# Patient Record
Sex: FEMALE | Race: WHITE | NOT HISPANIC OR LATINO | Employment: FULL TIME | URBAN - METROPOLITAN AREA
[De-identification: names, ages, dates, MRNs, and addresses within clinical notes are randomized per-mention and may not be internally consistent; named-entity substitution may affect disease eponyms.]

---

## 2017-02-18 ENCOUNTER — OFFICE VISIT (OUTPATIENT)
Dept: URGENT CARE | Facility: URGENT CARE | Age: 22
End: 2017-02-18
Payer: COMMERCIAL

## 2017-02-18 VITALS
WEIGHT: 161 LBS | HEART RATE: 80 BPM | SYSTOLIC BLOOD PRESSURE: 139 MMHG | BODY MASS INDEX: 26.79 KG/M2 | OXYGEN SATURATION: 96 % | TEMPERATURE: 98.1 F | DIASTOLIC BLOOD PRESSURE: 79 MMHG

## 2017-02-18 DIAGNOSIS — J20.9 ACUTE BRONCHITIS WITH SYMPTOMS > 10 DAYS: Primary | ICD-10-CM

## 2017-02-18 PROCEDURE — 99213 OFFICE O/P EST LOW 20 MIN: CPT | Performed by: FAMILY MEDICINE

## 2017-02-18 RX ORDER — AZITHROMYCIN 250 MG/1
TABLET, FILM COATED ORAL
Qty: 6 TABLET | Refills: 0 | Status: SHIPPED | OUTPATIENT
Start: 2017-02-18 | End: 2017-06-01

## 2017-02-18 NOTE — NURSING NOTE
"Chief Complaint   Patient presents with     Sick       Initial /79  Pulse 80  Temp 98.1  F (36.7  C) (Oral)  Wt 161 lb (73 kg)  SpO2 96%  BMI 26.79 kg/m2 Estimated body mass index is 26.79 kg/(m^2) as calculated from the following:    Height as of 9/22/16: 5' 5\" (1.651 m).    Weight as of this encounter: 161 lb (73 kg).  Medication Reconciliation: complete     DIGNA Cortez      "

## 2017-02-18 NOTE — MR AVS SNAPSHOT
After Visit Summary   2/18/2017    Nina Washington    MRN: [id number]           Patient Information     Date Of Birth          1995        Visit Information        Provider Department      2/18/2017 2:25 PM Jennifer Valdes MD [facility]        Today's Diagnoses     Acute bronchitis with symptoms > 10 days    -  1       Follow-ups after your visit        Who to contact     If you have questions or need follow up information about today's clinic visit or your schedule please contact [facility] directly at [phone].  Normal or non-critical lab and imaging results will be communicated to you by Click [employer] Growhart, letter or phone within 4 business days after the clinic has received the results. If you do not hear from us within 7 days, please contact the clinic through Xerot or phone. If you have a critical or abnormal lab result, we will notify you by phone as soon as possible.  Submit refill requests through [employer] or call your pharmacy and they will forward the refill request to us. Please allow 3 business days for your refill to be completed.          Additional Information About Your Visit        MyChart Information     [employer] gives you secure access to your electronic health record. If you see a primary care provider, you can also send messages to your care team and make appointments. If you have questions, please call your primary care clinic.  If you do not have a primary care provider, please call [phone] and they will assist you.        Care EveryWhere ID     This is your Care EveryWhere ID. This could be used by other organizations to access your [facility] medical records  [id number]        Your Vitals Were     Pulse Temperature Pulse Oximetry BMI (Body Mass Index)          80 98.1  F (36.7  C) (Oral) 96% 26.79 kg/m2         Blood Pressure from Last 3 Encounters:   02/18/17 139/79   09/22/16 140/79   07/22/16 134/86    Weight from Last 3 Encounters:    02/18/17 161 lb (73 kg)   09/22/16 151 lb 6.4 oz (68.7 kg)   07/22/16 145 lb (65.8 kg)              Today, you had the following     No orders found for display         Today's Medication Changes          These changes are accurate as of: 2/18/17 11:52 PM.  If you have any questions, ask your nurse or doctor.               Start taking these medicines.        Dose/Directions    azithromycin 250 MG tablet   Commonly known as:  ZITHROMAX   Used for:  Acute bronchitis with symptoms > 10 days   Started by:  Jennifer Valdes MD        2 tablets the first day, then 1 tablet daily for the next 4 days   Quantity:  6 tablet   Refills:  0            Where to get your medicines      These medications were sent to [id number] - [address] [facility] [address] AT [address] [place], [place] [address]     Phone:  [phone]     azithromycin 250 MG tablet                Primary Care Provider Office Phone # Fax #    Jaiden Marrero PA-C [phone]       [facility] [address] [facility] [address]        Thank you!     Thank you for choosing [facility]  for your care. Our goal is always to provide you with excellent care. Hearing back from our patients is one way we can continue to improve our services. Please take a few minutes to complete the written survey that you may receive in the mail after your visit with us. Thank you!             Your Updated Medication List - Protect others around you: Learn how to safely use, store and throw away your medicines at www.disposemymeds.org.          This list is accurate as of: 2/18/17 11:52 PM.  Always use your most recent med list.                   Brand Name Dispense Instructions for use    azithromycin 250 MG tablet    ZITHROMAX    6 tablet    2 tablets the first day, then 1 tablet daily for the next 4 days       drospirenone-ethinyl estradiol 3-0.03 MG per  tablet    OCELLA    84 tablet    Take 1 tablet by mouth daily

## 2017-05-27 ENCOUNTER — TRANSFERRED RECORDS (OUTPATIENT)
Dept: HEALTH INFORMATION MANAGEMENT | Facility: CLINIC | Age: 22
End: 2017-05-27

## 2017-06-01 ENCOUNTER — OFFICE VISIT (OUTPATIENT)
Dept: FAMILY MEDICINE | Facility: CLINIC | Age: 22
End: 2017-06-01
Payer: COMMERCIAL

## 2017-06-01 VITALS
HEART RATE: 110 BPM | HEIGHT: 65 IN | DIASTOLIC BLOOD PRESSURE: 88 MMHG | BODY MASS INDEX: 26.66 KG/M2 | RESPIRATION RATE: 16 BRPM | SYSTOLIC BLOOD PRESSURE: 130 MMHG | OXYGEN SATURATION: 98 % | TEMPERATURE: 99.5 F | WEIGHT: 160 LBS

## 2017-06-01 DIAGNOSIS — R59.0 ENLARGED LYMPH NODE IN NECK: Primary | ICD-10-CM

## 2017-06-01 LAB
BASOPHILS # BLD AUTO: 0 10E9/L (ref 0–0.2)
BASOPHILS NFR BLD AUTO: 0.3 %
DIFFERENTIAL METHOD BLD: ABNORMAL
EOSINOPHIL # BLD AUTO: 0.1 10E9/L (ref 0–0.7)
EOSINOPHIL NFR BLD AUTO: 1 %
ERYTHROCYTE [DISTWIDTH] IN BLOOD BY AUTOMATED COUNT: 12.2 % (ref 10–15)
HCT VFR BLD AUTO: 40.5 % (ref 35–47)
HETEROPH AB SER QL: NEGATIVE
HGB BLD-MCNC: 13.7 G/DL (ref 11.7–15.7)
LYMPHOCYTES # BLD AUTO: 1.8 10E9/L (ref 0.8–5.3)
LYMPHOCYTES NFR BLD AUTO: 15.8 %
MCH RBC QN AUTO: 29 PG (ref 26.5–33)
MCHC RBC AUTO-ENTMCNC: 33.8 G/DL (ref 31.5–36.5)
MCV RBC AUTO: 86 FL (ref 78–100)
MONOCYTES # BLD AUTO: 0.7 10E9/L (ref 0–1.3)
MONOCYTES NFR BLD AUTO: 5.9 %
NEUTROPHILS # BLD AUTO: 8.6 10E9/L (ref 1.6–8.3)
NEUTROPHILS NFR BLD AUTO: 77 %
PLATELET # BLD AUTO: 326 10E9/L (ref 150–450)
RBC # BLD AUTO: 4.73 10E12/L (ref 3.8–5.2)
WBC # BLD AUTO: 11.1 10E9/L (ref 4–11)

## 2017-06-01 PROCEDURE — 85025 COMPLETE CBC W/AUTO DIFF WBC: CPT | Performed by: PHYSICIAN ASSISTANT

## 2017-06-01 PROCEDURE — 36415 COLL VENOUS BLD VENIPUNCTURE: CPT | Performed by: PHYSICIAN ASSISTANT

## 2017-06-01 PROCEDURE — 99213 OFFICE O/P EST LOW 20 MIN: CPT | Performed by: PHYSICIAN ASSISTANT

## 2017-06-01 PROCEDURE — 86308 HETEROPHILE ANTIBODY SCREEN: CPT | Performed by: PHYSICIAN ASSISTANT

## 2017-06-01 RX ORDER — CLINDAMYCIN HCL 300 MG
CAPSULE ORAL
COMMUNITY
Start: 2017-05-27 | End: 2017-11-24

## 2017-06-01 ASSESSMENT — PAIN SCALES - GENERAL: PAINLEVEL: MILD PAIN (2)

## 2017-06-01 NOTE — NURSING NOTE
"Chief Complaint   Patient presents with     Lymphadenopathy     pt was seen at the [facility] clinic for Lymphadentis, given antibiotic but not better per pt       Initial /88  Pulse 110  Temp 99.5  F (37.5  C) (Oral)  Resp 16  Ht 5' 4.96\" (1.65 m)  Wt 160 lb (72.6 kg)  SpO2 98%  Breastfeeding? No  BMI 26.66 kg/m2 Estimated body mass index is 26.66 kg/(m^2) as calculated from the following:    Height as of this encounter: 5' 4.96\" (1.65 m).    Weight as of this encounter: 160 lb (72.6 kg).  Medication Reconciliation: complete   Tni Randhawa MA      "

## 2017-06-01 NOTE — PROGRESS NOTES
SUBJECTIVE:                                                    Nina Washington is a 22 year old female who presents to clinic today for the following health issues:    Swollen gland      Duration: 3 weeks    Description (location/character/radiation): swelling     Intensity:  mild    Accompanying signs and symptoms: none    History (similar episodes/previous evaluation): None    Precipitating or alleviating factors: None    Therapies tried and outcome: Antibiotic   No recent illness. Slight fatigue. No fevers, sore throat. States she otherwise feels fine.     Problem list and histories reviewed & adjusted, as indicated.  Additional history: as documented    Patient Active Problem List   Diagnosis     Lightheadedness     GERD (gastroesophageal reflux disease)     Orthostatic hypotension     Dysmenorrhea     Plantar warts     Skin abnormalities     Past Surgical History:   Procedure Laterality Date     NO HISTORY OF SURGERY       wisdom teeth  06/16       Social History   Substance Use Topics     Smoking status: Never Smoker     Smokeless tobacco: Never Used     Alcohol use No     Family History   Problem Relation Age of Onset     Hypertension Father      Allergies Father      Allergies Mother      Hypertension Paternal Grandfather      Prostate Cancer Paternal Grandfather      Cancer - colorectal Maternal Grandmother      Allergies Brother      CANCER Maternal Grandfather          Current Outpatient Prescriptions   Medication Sig Dispense Refill     clindamycin (CLEOCIN) 300 MG capsule        drospirenone-ethinyl estradiol (OCELLA) 3-0.03 MG per tablet Take 1 tablet by mouth daily 84 tablet 4     Allergies   Allergen Reactions     Nkda [No Known Drug Allergies]      Labs reviewed in EPIC    ROS:  Constitutional, HEENT, cardiovascular, pulmonary, gi and gu systems are negative, except as otherwise noted.    OBJECTIVE:                                                    /88  Pulse 110  Temp 99.5  F (37.5  C)  "(Oral)  Resp 16   5' 4.96\" (1.65 m)  Wt 160 lb (72.6 kg)  SpO2 98%  Breastfeeding? No  BMI 26.66 kg/m2  Body mass index is 26.66 kg/(m^2).  GENERAL: healthy, alert and no distress  EYES: Eyes grossly normal to inspection, PERRL and conjunctivae and sclerae normal  HENT: ear canals and TM's normal, nose and mouth without ulcers or lesions  NECK: cervical adenopathy 3 cm right, firm fixed. 1cm on left. no asymmetry, masses, or scars and thyroid normal to palpation  RESP: lungs clear to auscultation - no rales, rhonchi or wheezes  CV: regular rate and rhythm, normal S1 S2, no S3 or S4, no murmur, click or rub, no peripheral edema and peripheral pulses strong  ABDOMEN: soft, nontender, no hepatosplenomegaly, no masses and bowel sounds normal    Diagnostic Test Results:  Results for orders placed or performed in visit on 06/01/17 (from the past 24 hour(s))   CBC with platelets differential   Result Value Ref Range    WBC 11.1 (H) 4.0 - 11.0 10e9/L    RBC Count 4.73 3.8 - 5.2 10e12/L    Hemoglobin 13.7 11.7 - 15.7 g/dL    Hematocrit 40.5 35.0 - 47.0 %    MCV 86 78 - 100 fl    MCH 29.0 26.5 - 33.0 pg    MCHC 33.8 31.5 - 36.5 g/dL    RDW 12.2 10.0 - 15.0 %    Platelet Count 326 150 - 450 10e9/L    Diff Method Automated Method     % Neutrophils 77.0 %    % Lymphocytes 15.8 %    % Monocytes 5.9 %    % Eosinophils 1.0 %    % Basophils 0.3 %    Absolute Neutrophil 8.6 (H) 1.6 - 8.3 10e9/L    Absolute Lymphocytes 1.8 0.8 - 5.3 10e9/L    Absolute Monocytes 0.7 0.0 - 1.3 10e9/L    Absolute Eosinophils 0.1 0.0 - 0.7 10e9/L    Absolute Basophils 0.0 0.0 - 0.2 10e9/L   Mononucleosis screen   Result Value Ref Range    Mononucleosis Screen Negative NEG        ASSESSMENT/PLAN:                                                        ICD-10-CM    1. Enlarged lymph node in neck R59.0 CBC with platelets differential     Mononucleosis screen     US Head Neck Soft Tissue   advised getting U.S of neck.   warning signs discussed.   She " wanted to check with her insurance 1st before scheduling.    Jaiden Marrero PA-C  [facility]

## 2017-06-01 NOTE — MR AVS SNAPSHOT
After Visit Summary   6/1/2017    Nina Washington    MRN: [id number]           Patient Information     Date Of Birth          1995        Visit Information        Provider Department      6/1/2017 10:00 AM Jaiden Marrero PA-C [facility]        Today's Diagnoses     Enlarged lymph node in neck    -  1       Follow-ups after your visit        Future tests that were ordered for you today     Open Future Orders        Priority Expected Expires Ordered    US Head Neck Soft Tissue Routine  6/1/2018 6/1/2017            Who to contact     If you have questions or need follow up information about today's clinic visit or your schedule please contact [facility] directly at [phone].  Normal or non-critical lab and imaging results will be communicated to you by MyChart, letter or phone within 4 business days after the clinic has received the results. If you do not hear from us within 7 days, please contact the clinic through Schedule C Systemshart or phone. If you have a critical or abnormal lab result, we will notify you by phone as soon as possible.  Submit refill requests through Factor 14 or call your pharmacy and they will forward the refill request to us. Please allow 3 business days for your refill to be completed.          Additional Information About Your Visit        MyChart Information     [employer] gives you secure access to your electronic health record. If you see a primary care provider, you can also send messages to your care team and make appointments. If you have questions, please call your primary care clinic.  If you do not have a primary care provider, please call [phone] and they will assist you.        Care EveryWhere ID     This is your Care EveryWhere ID. This could be used by other organizations to access your [facility] medical records  [id number]        Your Vitals Were     Pulse Temperature Respirations Height Pulse Oximetry Breastfeeding?    110 99.5  F  "(37.5  C) (Oral) 16 5' 4.96\" (1.65 m) 98% No    BMI (Body Mass Index)                   26.66 kg/m2            Blood Pressure from Last 3 Encounters:   06/01/17 130/88   02/18/17 139/79   09/22/16 140/79    Weight from Last 3 Encounters:   06/01/17 160 lb (72.6 kg)   02/18/17 161 lb (73 kg)   09/22/16 151 lb 6.4 oz (68.7 kg)              We Performed the Following     CBC with platelets differential     Mononucleosis screen        Primary Care Provider Office Phone # Fax #    Jaiden Marrero PA-C [phone]       [facility] [address] [facility] [address]        Thank you!     Thank you for choosing [facility]  for your care. Our goal is always to provide you with excellent care. Hearing back from our patients is one way we can continue to improve our services. Please take a few minutes to complete the written survey that you may receive in the mail after your visit with us. Thank you!             Your Updated Medication List - Protect others around you: Learn how to safely use, store and throw away your medicines at www.disposemymeds.org.          This list is accurate as of: 6/1/17 10:28 AM.  Always use your most recent med list.                   Brand Name Dispense Instructions for use    clindamycin 300 MG capsule    CLEOCIN         drospirenone-ethinyl estradiol 3-0.03 MG per tablet    OCELLA    84 tablet    Take 1 tablet by mouth daily         "

## 2017-06-05 ENCOUNTER — TRANSFERRED RECORDS (OUTPATIENT)
Dept: HEALTH INFORMATION MANAGEMENT | Facility: CLINIC | Age: 22
End: 2017-06-05

## 2017-06-06 ENCOUNTER — TELEPHONE (OUTPATIENT)
Dept: FAMILY MEDICINE | Facility: CLINIC | Age: 22
End: 2017-06-06

## 2017-06-06 DIAGNOSIS — R59.0 ENLARGED LYMPH NODE IN NECK: Primary | ICD-10-CM

## 2017-06-06 NOTE — TELEPHONE ENCOUNTER
Reason for Call:  Request for results:    Name of test or procedure: Ultra Sound of neck     Date of test of procedure: 6/6/17     Location of the test or procedure: [facility] in [place]     OK to leave the result message on voice mail or with a family member? YES    Phone number Patient can be reached at:  Cell number on file:    Telephone Information:   Mobile [phone]       Additional comments: Patient is wondering if the Dr is going to call her to go over the results of her ultra sound or what are the next steps?   Call taken on 6/6/2017 at 4:46 PM by Yeni Vann

## 2017-06-06 NOTE — TELEPHONE ENCOUNTER
, can we please get a copy of the ultrasound?   Then route to PCP to review and advise.  Thank you, SURESH LopezN RN

## 2017-06-07 PROBLEM — R59.0 ENLARGED LYMPH NODE IN NECK: Status: ACTIVE | Noted: 2017-06-07

## 2017-06-07 NOTE — TELEPHONE ENCOUNTER
Received results from [facility], placed in your basket to review and comment for RN to relay results to patient.    When done, ROUTE back to MARIA ESTHER CUMMINS.    Fatimah Benton,

## 2017-06-07 NOTE — TELEPHONE ENCOUNTER
I reviewed results.     Left message on phone with results.  Will have her watch nodes and if not improving to follow up  With ENT in 4 wks.     Jaiden Marrero PA-C

## 2017-06-22 ENCOUNTER — TELEPHONE (OUTPATIENT)
Dept: FAMILY MEDICINE | Facility: CLINIC | Age: 22
End: 2017-06-22

## 2017-06-22 NOTE — TELEPHONE ENCOUNTER
"\"client you are trying to reach is not available.  Please try again later\"  X2.  Shirley Andrade RN    "

## 2017-06-22 NOTE — TELEPHONE ENCOUNTER
Reason for Call: Request for an order or referral:    Order or referral being requested: labs for lymes    Date needed: as soon as possible    Has the patient been seen by the PCP for this problem? YES    Additional comments:     Phone number Patient can be reached at:  Home number on file [phone] (home)    Best Time:      Can we leave a detailed message on this number?  YES    Call taken on 6/22/2017 at 12:21 PM by Jacqueline Pacheco

## 2017-06-23 NOTE — TELEPHONE ENCOUNTER
Left message on answering machine for patient/parent to call back.   [phone].  Corie Desai RN

## 2017-07-13 ENCOUNTER — OFFICE VISIT (OUTPATIENT)
Dept: OTOLARYNGOLOGY | Facility: CLINIC | Age: 22
End: 2017-07-13
Payer: COMMERCIAL

## 2017-07-13 VITALS — RESPIRATION RATE: 16 BRPM | WEIGHT: 162.6 LBS | HEIGHT: 65 IN | BODY MASS INDEX: 27.09 KG/M2

## 2017-07-13 DIAGNOSIS — R59.1 LYMPHADENOPATHY: Primary | ICD-10-CM

## 2017-07-13 LAB
BASOPHILS # BLD AUTO: 0 10E9/L (ref 0–0.2)
BASOPHILS NFR BLD AUTO: 0.6 %
CRP SERPL-MCNC: 6.2 MG/L (ref 0–8)
DIFFERENTIAL METHOD BLD: NORMAL
EOSINOPHIL # BLD AUTO: 0.2 10E9/L (ref 0–0.7)
EOSINOPHIL NFR BLD AUTO: 3.2 %
ERYTHROCYTE [DISTWIDTH] IN BLOOD BY AUTOMATED COUNT: 12.7 % (ref 10–15)
ERYTHROCYTE [SEDIMENTATION RATE] IN BLOOD BY WESTERGREN METHOD: 11 MM/H (ref 0–20)
HCT VFR BLD AUTO: 39.8 % (ref 35–47)
HGB BLD-MCNC: 13.6 G/DL (ref 11.7–15.7)
LYMPHOCYTES # BLD AUTO: 1.6 10E9/L (ref 0.8–5.3)
LYMPHOCYTES NFR BLD AUTO: 30.8 %
MCH RBC QN AUTO: 29.2 PG (ref 26.5–33)
MCHC RBC AUTO-ENTMCNC: 34.2 G/DL (ref 31.5–36.5)
MCV RBC AUTO: 85 FL (ref 78–100)
MONOCYTES # BLD AUTO: 0.4 10E9/L (ref 0–1.3)
MONOCYTES NFR BLD AUTO: 7.3 %
NEUTROPHILS # BLD AUTO: 3 10E9/L (ref 1.6–8.3)
NEUTROPHILS NFR BLD AUTO: 58.1 %
PLATELET # BLD AUTO: 341 10E9/L (ref 150–450)
RBC # BLD AUTO: 4.66 10E12/L (ref 3.8–5.2)
WBC # BLD AUTO: 5.1 10E9/L (ref 4–11)

## 2017-07-13 PROCEDURE — 36415 COLL VENOUS BLD VENIPUNCTURE: CPT | Performed by: OTOLARYNGOLOGY

## 2017-07-13 PROCEDURE — 85652 RBC SED RATE AUTOMATED: CPT | Performed by: OTOLARYNGOLOGY

## 2017-07-13 PROCEDURE — 85025 COMPLETE CBC W/AUTO DIFF WBC: CPT | Performed by: OTOLARYNGOLOGY

## 2017-07-13 PROCEDURE — 86140 C-REACTIVE PROTEIN: CPT | Performed by: OTOLARYNGOLOGY

## 2017-07-13 PROCEDURE — 99000 SPECIMEN HANDLING OFFICE-LAB: CPT | Performed by: OTOLARYNGOLOGY

## 2017-07-13 PROCEDURE — 86611 BARTONELLA ANTIBODY: CPT | Mod: 90 | Performed by: OTOLARYNGOLOGY

## 2017-07-13 PROCEDURE — 86618 LYME DISEASE ANTIBODY: CPT | Performed by: OTOLARYNGOLOGY

## 2017-07-13 PROCEDURE — 99203 OFFICE O/P NEW LOW 30 MIN: CPT | Performed by: OTOLARYNGOLOGY

## 2017-07-13 ASSESSMENT — PAIN SCALES - GENERAL: PAINLEVEL: NO PAIN (0)

## 2017-07-13 NOTE — PROGRESS NOTES
Chief Complaint - Neck mass    History of Present Illness - Nina Washington is a 22 year old female with neck lymphadenopathy. Began 2 months ago. Was feeling sick for awhile. Had low-grade fever and fatigue. Has right tender neck lump. Feels things are improving. No joint pain. No myalgias now, but had some at the beginning. No dysphagia, but has had some intermittent odynphagia. No breathing troubles. No weight loss. The patient doesn't smoke, or has a smoking history. She had CBC, and mono labs that showed a wbc of 11.1. Mono was negative. U/S from 6/2017 showed large lymphadenopathy 5.5 cmx1.9x2.8 right level 2 and 3.5x1.0x1.4 left level 2. All nodes have an intact fatty hilum. Tried clindamycin, didn't see help at all. She owns two cat, but didn't get bit or scratched by the cats. She doesn't recall any tick bites.     Past Medical History -   Patient Active Problem List   Diagnosis     Lightheadedness     GERD (gastroesophageal reflux disease)     Orthostatic hypotension     Dysmenorrhea     Plantar warts     Skin abnormalities     Enlarged lymph node in neck       Current Medications -   Current Outpatient Prescriptions:      clindamycin (CLEOCIN) 300 MG capsule, , Disp: , Rfl:      drospirenone-ethinyl estradiol (OCELLA) 3-0.03 MG per tablet, Take 1 tablet by mouth daily, Disp: 84 tablet, Rfl: 4    Allergies -   Allergies   Allergen Reactions     Nkda [No Known Drug Allergies]        Social History -   Social History     Social History     Marital status: Single     Spouse name: N/A     Number of children: N/A     Years of education: N/A     Social History Main Topics     Smoking status: Never Smoker     Smokeless tobacco: Never Used     Alcohol use No     Drug use: No     Sexual activity: Yes     Partners: Male     Birth control/ protection: Condom, Pill     Other Topics Concern      Service No     Blood Transfusions No     Caffeine Concern No     Occupational Exposure No     Hobby Hazards No      "Sleep Concern No     Stress Concern No     Weight Concern No     Special Diet No     Back Care No     Exercise Yes     Bike Helmet No     Seat Belt Yes     Self-Exams No     Social History Narrative       Family History -   Family History   Problem Relation Age of Onset     Hypertension Father      Allergies Father      Allergies Mother      Hypertension Paternal Grandfather      Prostate Cancer Paternal Grandfather      Cancer - colorectal Maternal Grandmother      Allergies Brother      CANCER Maternal Grandfather        Review of Systems - As per HPI and PMHx, she denies lymphadenopathy elsewhere. No dysphagia. otherwise 7 system review of the head and neck negative.    Physical Exam  Resp 16  Ht 1.65 m (5' 4.96\")  Wt 73.8 kg (162 lb 9.6 oz)  BMI 27.09 kg/m2  General - The patient is in no distress.  Alert and oriented x3, answers questions and cooperates with examination appropriately.   Voice and Breathing - The patient was breathing comfortably without the use of accessory muscles. There was no wheezing, stridor, or stertor.  The patients voice was clear and strong.  Eyes - Extraocular movements intact. Sclera were not icteric or injected, conjunctiva were pink and moist.  Neurologic - Cranial nerves II-XII are grossly intact. Specifically, the facial nerve is intact, House-Brackmann grade 1 of 6.   Ears - The auricles appeared normal. The external auditory canals were nonedematous and nonerythematous. The tympanic membranes are normal in appearance, bony landmarks are intact.  No retraction, perforation, or masses.  No fluid or purulence was seen in the external canal or the middle ear.   Nose - No significant external deformity.  Nasal mucosa is pink and moist with no abnormal mucus.  The septum was midline, turbinates are of normal size and position.  No polyps, masses, or purulence.  Mouth - Examination of the oral cavity showed pink, healthy oral mucosa. No lesions or ulcerations noted.  The tongue was " mobile and protrudes midline, no lesions.  Oropharynx - The walls of the oropharynx were smooth, symmetric, and had no lesions or ulcerations.  The tonsils were without masses or ulcerations. The uvula was midline and the palate raised symmetrically. Tonsils 2+, some crypts. No masses.  Neck - Palpation of the neck reveals a 2.5-3 cm mass in level 2 right side. It is mobile, some tenderness, likely reactive lymph node. No overlying skin changes. No fluctuance. Also a 1.5 cm lymph node in left side. The other occipital, submental, submandibular, internal jugular chain, and supraclavicular chains did not demonstrate any abnormal lymph nodes or masses. No parotid masses. Palpation of the thyroid was soft and smooth, with no nodules or goiter appreciated.  The trachea was midline.  Skin - close examination of the scalp and skin on the side of the mass reveals no suspicious lesions or skin cancers. No induration on palpation.      A/P - Nina Washington is a 22 year old female with bilateral level 2 neck lymphadenopathy for about 2 months. Also has felt fatigued, had fever, low WBC, and U/S that showed necrotic lymphadenopathy. She is better. The lymph nodes have significantly decrease in size. She still has fatigue and some tenderness. I'll repeat her CBC to make sure WBC has declined. Also will check CRP and sed rate. Lastly will check catch scratch antibodies and lyme disease screen. I will call her, and we can consider antibiotics. However, with things getting better we may not have to do that.      Bonifacio Reeder MD  Otolaryngology  [facility]

## 2017-07-13 NOTE — PATIENT INSTRUCTIONS
General Scheduling Information  To schedule your CT/MRI scan, please contact [facility] at [phone]   [address] [place] [address]    To schedule your Surgery, please contact our Specialty Schedulers at [phone]    ENT Clinic Locations Clinic Hours Telephone Number     [facility]  [address] [place] [address]   Tuesday:       8:00am -- 4:00pm    Wednesday:  8:00am - 4:00pm   To schedule an appointment with   Dr. Reeder,   please contact our   Specialty Scheduling Department at:     [phone]       [facility]  [address] [place] [address]   Friday:          8:00am - 4:00pm         Urgent Care Locations Clinic Hours Telephone Numbers     [facility]  [address] [place] [address]     Monday-Friday:     11:00pm - 9:00pm    Saturday-Sunday:  9:00am - 5:00pm   [phone]     [facility] Elliott  [address] [place] [address]     Monday-Friday:      5:00pm - 9:00pm     Saturday-Sunday:  9:00am - 5:00pm   [phone]

## 2017-07-13 NOTE — NURSING NOTE
"Chief Complaint   Patient presents with     Mass     Enlarged lymphnode(s)       Initial Resp 16  Ht 1.65 m (5' 4.96\")  Wt 73.8 kg (162 lb 9.6 oz)  BMI 27.09 kg/m2 Estimated body mass index is 27.09 kg/(m^2) as calculated from the following:    Height as of this encounter: 1.65 m (5' 4.96\").    Weight as of this encounter: 73.8 kg (162 lb 9.6 oz).  Medication Reconciliation: complete     Mag Perkins MA    "

## 2017-07-13 NOTE — MR AVS SNAPSHOT
After Visit Summary   7/13/2017    Nina Washington    MRN: [id number]           Patient Information     Date Of Birth          1995        Visit Information        Provider Department      7/13/2017 9:15 AM Bonifacio Reeder MD [facility]        Today's Diagnoses     Lymphadenopathy    -  1      Care Instructions    General Scheduling Information  To schedule your CT/MRI scan, please contact Hola Daniel at [phone]   [address] [place] [address]    To schedule your Surgery, please contact our Specialty Schedulers at [phone]    ENT Clinic Locations Clinic Hours Telephone Number     [facility] [place]  [address]  [place] [address]   Tuesday:       8:00am -- 4:00pm    Wednesday:  8:00am - 4:00pm   To schedule an appointment with   Dr. Reeder,   please contact our   Specialty Scheduling Department at:     [phone]       [facility]  [address] [place] [address]   Friday:          8:00am - 4:00pm         Urgent Care Locations Clinic Hours Telephone Numbers     [facility] [place]  [address] [place] [address]     Monday-Friday:     11:00pm - 9:00pm    Saturday-Sunday:  9:00am - 5:00pm   [phone]     [facility]  [address] [place] [address]     Monday-Friday:      5:00pm - 9:00pm     Saturday-Sunday:  9:00am - 5:00pm   [phone]               Follow-ups after your visit        Who to contact     If you have questions or need follow up information about today's clinic visit or your schedule please contact [facility] directly at [phone].  Normal or non-critical lab and imaging results will be communicated to you by MyChart, letter or phone within 4 business days after the clinic has received the results. If you do not hear from us within 7 days, please contact the clinic through MyChart or phone. If you have a critical or abnormal lab result, we will notify you by phone as  "soon as possible.  Submit refill requests through [employer] or call your pharmacy and they will forward the refill request to us. Please allow 3 business days for your refill to be completed.          Additional Information About Your Visit        [employer] Information     [employer] gives you secure access to your electronic health record. If you see a primary care provider, you can also send messages to your care team and make appointments. If you have questions, please call your primary care clinic.  If you do not have a primary care provider, please call [phone] and they will assist you.        Care EveryWhere ID     This is your Care EveryWhere ID. This could be used by other organizations to access your [place] medical records  [id number]        Your Vitals Were     Respirations Height BMI (Body Mass Index)             16 1.65 m (5' 4.96\") 27.09 kg/m2          Blood Pressure from Last 3 Encounters:   06/01/17 130/88   02/18/17 139/79   09/22/16 140/79    Weight from Last 3 Encounters:   07/13/17 73.8 kg (162 lb 9.6 oz)   06/01/17 72.6 kg (160 lb)   02/18/17 73 kg (161 lb)              We Performed the Following     B Henselae antibody IgG and IgM     CBC with platelets and differential     CRP, inflammation     ESR: Erythrocyte sedimentation rate     Lyme Disease Barb with reflex to WB Serum        Primary Care Provider Office Phone # Fax #    Jaiden Marrero PA-C [phone]       [facility] [id number] [place] [address]        Equal Access to Services     RUBEN AVILA : Hadii aad ku hadasho Soomaali, waaxda luqadaha, qaybta kaalmada adeegyada, georges archuleta. So [facility] [phone].    ATENCIÓN: Si habla español, tiene a jamison disposición servicios gratuitos de asistencia lingüística. Llame al [phone].    We comply with applicable federal civil rights laws and [place] laws. We do not discriminate on the basis of race, color, national origin, " age, disability sex, sexual orientation or gender identity.            Thank you!     Thank you for choosing [facility]  for your care. Our goal is always to provide you with excellent care. Hearing back from our patients is one way we can continue to improve our services. Please take a few minutes to complete the written survey that you may receive in the mail after your visit with us. Thank you!             Your Updated Medication List - Protect others around you: Learn how to safely use, store and throw away your medicines at www.disposemymeds.org.          This list is accurate as of: 7/13/17 11:25 AM.  Always use your most recent med list.                   Brand Name Dispense Instructions for use Diagnosis    clindamycin 300 MG capsule    CLEOCIN          drospirenone-ethinyl estradiol 3-0.03 MG per tablet    OCELLA    84 tablet    Take 1 tablet by mouth daily    Dysmenorrhea

## 2017-07-14 LAB — B BURGDOR IGG+IGM SER QL: 0.06 (ref 0–0.89)

## 2017-07-16 LAB
B HENSELAE IGG TITR SER IF: ABNORMAL {TITER}
B HENSELAE IGM TITR SER IF: ABNORMAL {TITER}

## 2017-07-18 ENCOUNTER — TELEPHONE (OUTPATIENT)
Dept: OTOLARYNGOLOGY | Facility: CLINIC | Age: 22
End: 2017-07-18

## 2017-07-18 NOTE — TELEPHONE ENCOUNTER
I spoke with patient on the phone. Lab results were normal. I think this is resolving lymphadenitis. We can observe this for now. She is feeling much better. If nodes don't go away, start to grow, or new ones appear she needs to return. She understands.

## 2017-10-28 DIAGNOSIS — N94.6 DYSMENORRHEA: ICD-10-CM

## 2017-11-03 RX ORDER — DROSPIRENONE AND ETHINYL ESTRADIOL 0.03MG-3MG
1 KIT ORAL DAILY
Qty: 28 TABLET | Refills: 0 | Status: SHIPPED | OUTPATIENT
Start: 2017-11-03 | End: 2017-11-24

## 2017-11-03 NOTE — TELEPHONE ENCOUNTER
Last WWE was with BANDAR Moore CNP on 09-22-16.   No future appt scheduled.    This writer attempted to contact Nina on 11/03/17      Reason for call about a refill request received from pharmacy and left message to return call.      If patient calls back:   Schedule Office Visit appointment within 1 month with provider for WWE. May need to be seen by primary care as BANDAR Moore CNP is still out on medical leave  Then route to care team so patient can get RF if needed to get to future appt.     Yeni Gagnon RN

## 2017-11-03 NOTE — TELEPHONE ENCOUNTER
Next 5 appointments (look out 90 days)     Nov 24, 2017  3:00 PM CST   Office Visit with Danie Adhikari MD   [facility] ([facility])    [address] [place] [address] [phone]                Prescription approved per [facility] Refill Protocol.  Yeni Gagnon RN, BAN

## 2017-11-24 ENCOUNTER — OFFICE VISIT (OUTPATIENT)
Dept: OBGYN | Facility: CLINIC | Age: 22
End: 2017-11-24
Payer: COMMERCIAL

## 2017-11-24 VITALS
DIASTOLIC BLOOD PRESSURE: 84 MMHG | BODY MASS INDEX: 26.49 KG/M2 | SYSTOLIC BLOOD PRESSURE: 142 MMHG | HEART RATE: 124 BPM | HEIGHT: 65 IN | TEMPERATURE: 98.5 F | WEIGHT: 159 LBS

## 2017-11-24 DIAGNOSIS — Z11.8 SPECIAL SCREENING EXAMINATION FOR CHLAMYDIAL DISEASE: ICD-10-CM

## 2017-11-24 DIAGNOSIS — Z01.419 ENCOUNTER FOR GYNECOLOGICAL EXAMINATION WITHOUT ABNORMAL FINDING: Primary | ICD-10-CM

## 2017-11-24 DIAGNOSIS — N94.6 DYSMENORRHEA: ICD-10-CM

## 2017-11-24 PROBLEM — R59.0 ENLARGED LYMPH NODE IN NECK: Status: RESOLVED | Noted: 2017-06-07 | Resolved: 2017-11-24

## 2017-11-24 PROCEDURE — 99395 PREV VISIT EST AGE 18-39: CPT | Performed by: OBSTETRICS & GYNECOLOGY

## 2017-11-24 PROCEDURE — 87491 CHLMYD TRACH DNA AMP PROBE: CPT | Performed by: OBSTETRICS & GYNECOLOGY

## 2017-11-24 RX ORDER — DROSPIRENONE AND ETHINYL ESTRADIOL 0.03MG-3MG
1 KIT ORAL DAILY
Qty: 84 TABLET | Refills: 4 | Status: SHIPPED | OUTPATIENT
Start: 2017-11-24 | End: 2018-12-18

## 2017-11-24 NOTE — PATIENT INSTRUCTIONS
If you have any questions regarding your visit, Please contact your care team.     [facility]: [phone]    [facility] CLINIC HOURS TELEPHONE NUMBER   RAI Xiong-    Leo Jaime-DUANE Morgan-Medical Assistant   Monday-[place]  8:00a.m-4:45 p.m  Wednesday-[place] 8:00a.m-4:45 p.m.  Thursday-[place]  8:00a.m-4:45 p.m.  Friday-[place]  8:00a.m-4:45 p.m. [facility]  [address] [place] [address]  [phone] ask [facility]  [phone] Fax  Imaging [phone]    [facility] Labor and Delivery  [address] [place] [address] [place] [address]  [phone]    [facility]  [address] [place] [address]  [phone] [facility]  [phone] Fax  Imaging [phone]     Urgent Care locations:    [place]        [place] Monday-Friday  5 pm - 9 pm  Saturday and Sunday   9 am - 5 pm    Monday-Friday   11 am - 9 pm  Saturday and Sunday   9 am - 5 pm   [phone]       If you need a medication refill, please contact your pharmacy. Please allow 3 business days for your refill to be completed.  As always, Thank you for trusting us with your healthcare needs!

## 2017-11-24 NOTE — MR AVS SNAPSHOT
After Visit Summary   11/24/2017    Nina Washington    MRN: [id number]           Patient Information     Date Of Birth          1995        Visit Information        Provider Department      11/24/2017 3:00 PM Danie Adhikari MD [facility]        Care Instructions                                                        If you have any questions regarding your visit, Please contact your care team.     [facility]: [phone]    [facility] CLINIC HOURS TELEPHONE NUMBER   Dnaie Adhikari M.D.      Rosalba-    Leo Jaime-DUANE Morgan-Medical Assistant   Monday-[place]  8:00a.m-4:45 p.m  Wednesday-[place] 8:00a.m-4:45 p.m.  Thursday-[place]  8:00a.m-4:45 p.m.  Friday-[place]  8:00a.m-4:45 p.m. [facility]  [address] [place] [address] [place] [address]  [phone] ask for [facility]  [phone] Fax  Imaging [phone]    [facility] Labor and Delivery  [address] [place] [address] [address]  [place], [place] [address] [phone]    [facility]  [address] [place] [address]  [phone] ask [facility]  [phone] Fax  Imaging [phone]     Urgent Care locations:    [facility] Monday-Friday  5 pm - 9 pm  Saturday and Sunday   9 am - 5 pm    Monday-Friday   11 am - 9 pm  Saturday and Sunday   9 am - 5 pm   [phone]       If you need a medication refill, please contact your pharmacy. Please allow 3 business days for your refill to be completed.  As always, Thank you for trusting us with your healthcare needs!            Follow-ups after your visit        Who to contact     If you have questions or need follow up information about today's clinic visit or your schedule please contact [facility] directly at [phone].  Normal or non-critical lab and imaging results will be communicated to you by  "MyChart, letter or phone within 4 business days after the clinic has received the results. If you do not hear from us within 7 days, please contact the clinic through [employer]hart or phone. If you have a critical or abnormal lab result, we will notify you by phone as soon as possible.  Submit refill requests through [employer] or call your pharmacy and they will forward the refill request to us. Please allow 3 business days for your refill to be completed.          Additional Information About Your Visit        [employer] Information     [employer] gives you secure access to your electronic health record. If you see a primary care provider, you can also send messages to your care team and make appointments. If you have questions, please call your primary care clinic.  If you do not have a primary care provider, please call [phone] and they will assist you.        Care EveryWhere ID     This is your Care EveryWhere ID. This could be used by other organizations to access your [place] medical records  [id number]        Your Vitals Were     Pulse Temperature Height Last Period Breastfeeding? BMI (Body Mass Index)    124 98.5  F (36.9  C) (Oral) 5' 4.6\" (1.641 m) 11/03/2017 (Approximate) No 26.79 kg/m2       Blood Pressure from Last 3 Encounters:   11/24/17 142/84   06/01/17 130/88   02/18/17 139/79    Weight from Last 3 Encounters:   11/24/17 159 lb (72.1 kg)   07/13/17 162 lb 9.6 oz (73.8 kg)   06/01/17 160 lb (72.6 kg)              Today, you had the following     No orders found for display       Primary Care Provider Office Phone # Fax #    Jaiden Marrero PA-C [phone] [address] [facility] [address]        Equal Access to Services     RUBEN AVILA : Cande Bowers, chris gupta, rc mike, georges archuleta. So [facility] [phone].    ATENCIÓN: Si habla español, tiene a jamison disposición servicios gratuitos de asistencia lingüística. " Rosalba faulkner [phone].    We comply with applicable federal civil rights laws and [place] laws. We do not discriminate on the basis of race, color, national origin, age, disability, sex, sexual orientation, or gender identity.            Thank you!     Thank you for choosing [facility]  for your care. Our goal is always to provide you with excellent care. Hearing back from our patients is one way we can continue to improve our services. Please take a few minutes to complete the written survey that you may receive in the mail after your visit with us. Thank you!             Your Updated Medication List - Protect others around you: Learn how to safely use, store and throw away your medicines at www.disposemymeds.org.          This list is accurate as of: 11/24/17  3:03 PM.  Always use your most recent med list.                   Brand Name Dispense Instructions for use Diagnosis    clindamycin 300 MG capsule    CLEOCIN          drospirenone-ethinyl estradiol 3-0.03 MG per tablet    CAROLYN    28 tablet    Take 1 tablet by mouth daily Patient needs to be seen prior to further refills.    Dysmenorrhea

## 2017-11-24 NOTE — NURSING NOTE
"Chief Complaint   Patient presents with     Physical     AFE       Initial /84 (BP Location: Left arm, Patient Position: Chair, Cuff Size: Adult Regular)  Pulse 124  Temp 98.5  F (36.9  C) (Oral)  Ht 5' 4.6\" (1.641 m)  Wt 159 lb (72.1 kg)  LMP 11/03/2017 (Approximate)  Breastfeeding? No  BMI 26.79 kg/m2 Estimated body mass index is 26.79 kg/(m^2) as calculated from the following:    Height as of this encounter: 5' 4.6\" (1.641 m).    Weight as of this encounter: 159 lb (72.1 kg).  Medication Reconciliation: complete   Cathy Selby CMA      "

## 2017-11-25 NOTE — PROGRESS NOTES
Nina Washington is a 22 year old year old who presents for annual exam. Patient's last menstrual period was 11/03/2017 (approximate).    HPI: Doing well.  Menses q 28-30 days x 3-4 days with dysmenorrhea controlled on OC.  Significant interval changes: none.  Current significant symptoms: none.  Other problems or concerns: none.   Contraceptive method is OC.     Past medical, obstetrical, surgical, family and social history reviewed and as noted or updated in chart.     Allergies, meds and supplements are as noted or updated in chart.     ROS:     Systems reviewed include constitutional; breast;                 cardiac; respiratory; gastrointestinal; genitourinary;                                musculoskeletal; integumentary; psychological;                                hematologic/lymphatic and endocrine.                  These systems were negative for significant symptoms except                 for the following additional: none ; see HPI.                                Exam:  VS as noted.                  Neck, nodes, breasts, abd and pelvis were                             normal or negative except for, or in particular noting, the following                pertinent findings: bilateral chronic nipple inversion, no pelvic nodularity.      Assessment: Gyn exam. Problem List updated.    Plan and Recommendations: Symptoms, problems and concerns reviewed. Health habits and vaccinations reviewed. Calcium/Vitamin D and multi-vitamin supplements instructions given. Breast/skin self-exam awareness. Screening tests including limitations discussed. Follow-up visits discussed. See orders. Pap due in 2 yrs. Tdap due now and patient to consider. RTC 1 year.  Medications and prescriptions given as noted.  I reviewed side effects, risks, benefits and instructions on proper use.  Encouraged smoking cessation.     Nina was seen today for physical.    Diagnoses and all orders for this visit:    Encounter for  gynecological examination without abnormal finding    Dysmenorrhea  -     drospirenone-ethinyl estradiol (CAROLYN) 3-0.03 MG per tablet; Take 1 tablet by mouth daily    Special screening examination for chlamydial disease  -     Cancel: Chlamydia trachomatis PCR  -     Chlamydia trachomatis PCR; Future  -     Chlamydia trachomatis PCR        Danie Adhikari MD

## 2017-11-26 LAB
C TRACH DNA SPEC QL NAA+PROBE: NEGATIVE
SPECIMEN SOURCE: NORMAL

## 2017-11-29 DIAGNOSIS — N94.6 DYSMENORRHEA: ICD-10-CM

## 2017-11-29 RX ORDER — DROSPIRENONE AND ETHINYL ESTRADIOL 0.03MG-3MG
KIT ORAL
Qty: 1 TABLET | Refills: 0 | Status: SHIPPED | OUTPATIENT
Start: 2017-11-29 | End: 2018-12-17

## 2017-11-29 NOTE — TELEPHONE ENCOUNTER
Refill was sent on 11/24/17 by Dr. Adhikari at [facility].  Can disregard request.

## 2018-12-18 ENCOUNTER — OFFICE VISIT (OUTPATIENT)
Dept: OBGYN | Facility: CLINIC | Age: 23
End: 2018-12-18
Payer: COMMERCIAL

## 2018-12-18 VITALS
HEIGHT: 66 IN | WEIGHT: 161.4 LBS | TEMPERATURE: 97.7 F | SYSTOLIC BLOOD PRESSURE: 138 MMHG | OXYGEN SATURATION: 97 % | HEART RATE: 95 BPM | BODY MASS INDEX: 25.94 KG/M2 | DIASTOLIC BLOOD PRESSURE: 92 MMHG

## 2018-12-18 DIAGNOSIS — Z23 NEED FOR TDAP VACCINATION: ICD-10-CM

## 2018-12-18 DIAGNOSIS — N94.6 DYSMENORRHEA: ICD-10-CM

## 2018-12-18 DIAGNOSIS — Z01.419 ENCOUNTER FOR GYNECOLOGICAL EXAMINATION WITHOUT ABNORMAL FINDING: Primary | ICD-10-CM

## 2018-12-18 PROCEDURE — 90471 IMMUNIZATION ADMIN: CPT | Performed by: NURSE PRACTITIONER

## 2018-12-18 PROCEDURE — 90715 TDAP VACCINE 7 YRS/> IM: CPT | Performed by: NURSE PRACTITIONER

## 2018-12-18 PROCEDURE — 99395 PREV VISIT EST AGE 18-39: CPT | Mod: 25 | Performed by: NURSE PRACTITIONER

## 2018-12-18 RX ORDER — DROSPIRENONE AND ETHINYL ESTRADIOL 0.03MG-3MG
1 KIT ORAL DAILY
Qty: 84 TABLET | Refills: 3 | Status: SHIPPED | OUTPATIENT
Start: 2018-12-18 | End: 2019-12-03

## 2018-12-18 ASSESSMENT — PAIN SCALES - GENERAL: PAINLEVEL: NO PAIN (0)

## 2018-12-18 ASSESSMENT — MIFFLIN-ST. JEOR: SCORE: 1495.92

## 2018-12-18 NOTE — NURSING NOTE
"Chief Complaint   Patient presents with     Physical       Initial BP (!) 138/92   Pulse 95   Temp 97.7  F (36.5  C) (Oral)   Ht 1.664 m (5' 5.5\")   Wt 73.2 kg (161 lb 6.4 oz)   LMP 12/04/2018 (Approximate)   SpO2 97%   BMI 26.45 kg/m   Estimated body mass index is 26.45 kg/m  as calculated from the following:    Height as of this encounter: 1.664 m (5' 5.5\").    Weight as of this encounter: 73.2 kg (161 lb 6.4 oz)..  BP completed using cuff size: regular    Screening Questionnaire for Adult Immunization    Are you sick today?   No   Do you have allergies to medications, food, a vaccine component or latex?   No   Have you ever had a serious reaction after receiving a vaccination?   No   Do you have a long-term health problem with heart disease, lung disease, asthma, kidney disease, metabolic disease (e.g. diabetes), anemia, or other blood disorder?   No   Do you have cancer, leukemia, HIV/AIDS, or any other immune system problem?   No   In the past 3 months, have you taken medications that affect  your immune system, such as prednisone, other steroids, or anticancer drugs; drugs for the treatment of rheumatoid arthritis, Crohn s disease, or psoriasis; or have you had radiation treatments?   No   Have you had a seizure, or a brain or other nervous system problem?   No   During the past year, have you received a transfusion of blood or blood     products, or been given immune (gamma) globulin or antiviral drug?   No   For women: Are you pregnant or is there a chance you could become        pregnant during the next month?   No   Have you received any vaccinations in the past 4 weeks?   No     Immunization questionnaire answers were all negative.        Per orders of Essence PORTILLO CNP, injection of Tdap given by Raven Arriola. Patient instructed to remain in clinic for 15 minutes afterwards, and to report any adverse reaction to me immediately.       Screening performed by Raven Arriola on 12/18/2018 at " 8:21 AM.        Raven Arriola CMA

## 2018-12-18 NOTE — PROGRESS NOTES
SUBJECTIVE:   CC: Nina Washington is an 23 year old woman who presents for preventive health visit.     Physical   Annual:     Getting at least 3 servings of Calcium per day:  Yes    Bi-annual eye exam:  Yes    Dental care twice a year:  Yes    Sleep apnea or symptoms of sleep apnea:  None    Diet:  Regular (no restrictions)    Frequency of exercise:  2-3 days/week    Duration of exercise:  15-30 minutes    Taking medications regularly:  Yes    Medication side effects:  None    Additional concerns today:  Yes    PHQ-2 Total Score: 0      Has questions about possible change from oral contraceptive pills to Depo Provera.       Today's PHQ-2 Score:   PHQ-2 ( 1999 Pfizer) 12/18/2018   Q1: Little interest or pleasure in doing things 0   Q2: Feeling down, depressed or hopeless 0   PHQ-2 Score 0   Q1: Little interest or pleasure in doing things Not at all   Q2: Feeling down, depressed or hopeless Not at all   PHQ-2 Score 0       Abuse: Current or Past(Physical, Sexual or Emotional)- No  Do you feel safe in your environment? Yes    Social History     Tobacco Use     Smoking status: Never Smoker     Smokeless tobacco: Never Used   Substance Use Topics     Alcohol use: No     Alcohol Use 12/18/2018   If you drink alcohol do you typically have greater than 3 drinks per day OR greater than 7 drinks per week? No   No flowsheet data found.    Reviewed orders with patient.  Reviewed health maintenance and updated orders accordingly - Yes  Patient Active Problem List   Diagnosis     Dysmenorrhea     Past Surgical History:   Procedure Laterality Date     wisdom teeth  2016       Social History     Tobacco Use     Smoking status: Never Smoker     Smokeless tobacco: Never Used   Substance Use Topics     Alcohol use: No     Family History   Problem Relation Age of Onset     Hypertension Father      Allergies Father      Allergies Mother      Hypertension Paternal Grandfather      Prostate Cancer Paternal Grandfather      Cancer -  "colorectal Maternal Grandmother      Allergies Brother      Cancer Maternal Grandfather            Mammogram not appropriate for this patient based on age.    Pertinent mammograms are reviewed under the imaging tab.  History of abnormal Pap smear: NO - age 21-29 PAP every 3 years recommended  PAP / HPV 9/22/2016   PAP NIL     Reviewed and updated as needed this visit by clinical staff  Tobacco  Allergies  Meds  Med Hx  Surg Hx  Fam Hx  Soc Hx        Reviewed and updated as needed this visit by Provider        Past Medical History:   Diagnosis Date     Dysmenorrhea       Past Surgical History:   Procedure Laterality Date     wisdom teeth  2016       Review of Systems  CONSTITUTIONAL: NEGATIVE for fever, chills, change in weight  INTEGUMENTARU/SKIN: NEGATIVE for worrisome rashes, moles or lesions  EYES: NEGATIVE for vision changes or irritation  ENT: NEGATIVE for ear, mouth and throat problems  RESP: NEGATIVE for significant cough or SOB  BREAST: NEGATIVE for masses, tenderness or discharge  CV: NEGATIVE for chest pain, palpitations or peripheral edema  GI: NEGATIVE for nausea, abdominal pain, heartburn, or change in bowel habits  : NEGATIVE for unusual urinary or vaginal symptoms. Periods are regular.  MUSCULOSKELETAL: NEGATIVE for significant arthralgias or myalgia  NEURO: NEGATIVE for weakness, dizziness or paresthesias  PSYCHIATRIC: NEGATIVE for changes in mood or affect     OBJECTIVE:   BP (!) 138/92   Pulse 95   Temp 97.7  F (36.5  C) (Oral)   Ht 1.664 m (5' 5.5\")   Wt 73.2 kg (161 lb 6.4 oz)   LMP 12/04/2018 (Approximate)   SpO2 97%   BMI 26.45 kg/m    Physical Exam  GENERAL: healthy, alert and no distress  EYES: Eyes grossly normal to inspection, PERRL and conjunctivae and sclerae normal  HENT: ear canals and TM's normal, nose and mouth without ulcers or lesions  NECK: no adenopathy, no asymmetry, masses, or scars and thyroid normal to palpation  RESP: lungs clear to auscultation - no rales, " rhonchi or wheezes  BREAST: normal without masses, tenderness or nipple discharge and no palpable axillary masses or adenopathy  CV: regular rate and rhythm, normal S1 S2, no S3 or S4, no murmur, click or rub, no peripheral edema and peripheral pulses strong  ABDOMEN: soft, nontender, no hepatosplenomegaly, no masses and bowel sounds normal   (female): normal female external genitalia, normal urethral meatus, vaginal mucosa pink, moist, well rugated, and normal cervix/adnexa/uterus without masses or discharge  MS: no gross musculoskeletal defects noted, no edema  SKIN: no suspicious lesions or rashes  NEURO: Normal strength and tone, mentation intact and speech normal  PSYCH: mentation appears normal, affect normal/bright    ASSESSMENT/PLAN:   1. Encounter for gynecological examination without abnormal finding  Pap smear up to date    2. Need for Tdap vaccination  - 1st  Administration  [03001]    3. Dysmenorrhea  We discussed oral contraceptive pills vs Depo Provera for managing her cycles and also for contraception. For Depo Provera, discussed injection every 3 months, possible menstrual changes and other side effcts. Discussed clinic policy for returning on time for injection. Discussed delay with return to ovulation and need for adequate calcium, weight bearing exercise. For now, prefers to continue oral contraceptive pills, prescription sent. Call if she would like orders for Depo.  - drospirenone-ethinyl estradiol (AUSTEN) 3-0.03 MG tablet; Take 1 tablet by mouth daily  Dispense: 84 tablet; Refill: 3    COUNSELING:  Reviewed preventive health counseling, as reflected in patient instructions  Special attention given to:        Regular exercise       Healthy diet/nutrition       Contraception       Safe sex practices/STD prevention       HIV screeninx in teen years, 1x in adult years, and at intervals if high risk    BP Readings from Last 1 Encounters:   18 (!) 138/92     Estimated body mass index is  "26.45 kg/m  as calculated from the following:    Height as of this encounter: 1.664 m (5' 5.5\").    Weight as of this encounter: 73.2 kg (161 lb 6.4 oz).           reports that  has never smoked. she has never used smokeless tobacco.      Counseling Resources:  ATP IV Guidelines  Pooled Cohorts Equation Calculator  Breast Cancer Risk Calculator  FRAX Risk Assessment  ICSI Preventive Guidelines  Dietary Guidelines for Americans, 2010  USDA's MyPlate  ASA Prophylaxis  Lung CA Screening    BANDAR Bennett [facility]  "

## 2019-06-04 ENCOUNTER — TRANSFERRED RECORDS (OUTPATIENT)
Dept: HEALTH INFORMATION MANAGEMENT | Facility: CLINIC | Age: 24
End: 2019-06-04

## 2019-06-06 ENCOUNTER — OFFICE VISIT (OUTPATIENT)
Dept: FAMILY MEDICINE | Facility: CLINIC | Age: 24
End: 2019-06-06
Payer: COMMERCIAL

## 2019-06-06 VITALS
TEMPERATURE: 98.6 F | DIASTOLIC BLOOD PRESSURE: 71 MMHG | HEART RATE: 96 BPM | WEIGHT: 154.4 LBS | RESPIRATION RATE: 20 BRPM | OXYGEN SATURATION: 98 % | BODY MASS INDEX: 25.3 KG/M2 | SYSTOLIC BLOOD PRESSURE: 117 MMHG

## 2019-06-06 DIAGNOSIS — Z09 FOLLOW-UP EXAM: ICD-10-CM

## 2019-06-06 DIAGNOSIS — H66.012 ACUTE SUPPURATIVE OTITIS MEDIA OF LEFT EAR WITH SPONTANEOUS RUPTURE OF TYMPANIC MEMBRANE, RECURRENCE NOT SPECIFIED: Primary | ICD-10-CM

## 2019-06-06 PROCEDURE — 99214 OFFICE O/P EST MOD 30 MIN: CPT | Performed by: NURSE PRACTITIONER

## 2019-06-06 RX ORDER — AMOXICILLIN 875 MG
1 TABLET ORAL 2 TIMES DAILY
COMMUNITY
Start: 2019-06-04 | End: 2019-06-06

## 2019-06-06 RX ORDER — CEFDINIR 300 MG/1
300 CAPSULE ORAL 2 TIMES DAILY
Qty: 20 CAPSULE | Refills: 0 | Status: SHIPPED | OUTPATIENT
Start: 2019-06-06 | End: 2019-12-03

## 2019-06-06 RX ORDER — OFLOXACIN 3 MG/ML
5 SOLUTION AURICULAR (OTIC) DAILY
Qty: 2 ML | Refills: 0 | Status: SHIPPED | OUTPATIENT
Start: 2019-06-06 | End: 2019-12-03

## 2019-06-06 NOTE — PROGRESS NOTES
Subjective     Nina Washington is a 24 year old female who presents to clinic today for the following health issues:    HPI   Acute Illness   Acute illness concerns: left ear pain  Onset: sunday    Fever: no    Chills/Sweats: no    Headache (location?): YES    Sinus Pressure:no    Conjunctivitis:  no    Ear Pain: YES: left    Rhinorrhea: no     Congestion: no     Sore Throat: no     Cough: no    Wheeze: no    Decreased Appetite: no    Nausea: no     Vomiting: no    Diarrhea:  no    Dysuria/Freq.: no    Fatigue/Achiness: no    Sick/Strep Exposure: no     Therapies Tried and outcome: went to [facility] on 6/4/18 and given Abx for 7 days. Feeling like ear isnt getting better.  Lyden drainage yesterday      Patient Active Problem List   Diagnosis     Dysmenorrhea     Past Surgical History:   Procedure Laterality Date     wisdom teeth  2016       Social History     Tobacco Use     Smoking status: Never Smoker     Smokeless tobacco: Never Used   Substance Use Topics     Alcohol use: No     Family History   Problem Relation Age of Onset     Hypertension Father      Allergies Father      Allergies Mother      Hypertension Paternal Grandfather      Prostate Cancer Paternal Grandfather      Cancer - colorectal Maternal Grandmother      Allergies Brother      Cancer Maternal Grandfather          Current Outpatient Medications   Medication Sig Dispense Refill     cefdinir (OMNICEF) 300 MG capsule Take 1 capsule (300 mg) by mouth 2 times daily for 10 days 20 capsule 0     drospirenone-ethinyl estradiol (AUSTEN) 3-0.03 MG tablet Take 1 tablet by mouth daily 84 tablet 3     ofloxacin (FLOXIN) 0.3 % otic solution Place 5 drops Into the left ear daily for 7 days 2 mL 0     Allergies   Allergen Reactions     Nkda [No Known Drug Allergies]      Recent Labs   Lab Test 09/21/15  1454   TSH 3.86      BP Readings from Last 3 Encounters:   06/06/19 117/71   12/18/18 (!) 138/92   11/24/17 142/84    Wt Readings from Last 3 Encounters:  "  06/06/19 70 kg (154 lb 6.4 oz)   12/18/18 73.2 kg (161 lb 6.4 oz)   11/24/17 72.1 kg (159 lb)               Reviewed and updated as needed this visit by Provider  Tobacco  Allergies  Meds  Problems  Med Hx  Surg Hx  Fam Hx         Review of Systems   ROS COMP: Constitutional, HEENT, cardiovascular, pulmonary, GI, , musculoskeletal, neuro, skin, endocrine and psych systems are negative, except as otherwise noted.      Objective    /71   Pulse 96   Temp 98.6  F (37  C) (Tympanic)   Resp 20   Wt 70 kg (154 lb 6.4 oz)   LMP 05/16/2019 (Approximate)   SpO2 98%   BMI 25.30 kg/m    Body mass index is 25.3 kg/m .     Physical Exam   GENERAL: healthy, alert and no distress  HENT: ear canals and R TM normal, nose and mouth without ulcers or lesions POSITIVE for L TM ruptured with purulent drainage coming from ear.   NECK: no adenopathy, no asymmetry, masses, or scars and thyroid normal to palpation  RESP: lungs clear to auscultation - no rales, rhonchi or wheezes  CV: regular rate and rhythm, normal S1 S2, no S3 or S4, no murmur, click or rub, no peripheral edema and peripheral pulses strong  PSYCH: mentation appears normal, affect normal/bright    Diagnostic Test Results:  Labs reviewed in Epic          Assessment & Plan       ICD-10-CM    1. Acute suppurative otitis media of left ear with spontaneous rupture of tympanic membrane, recurrence not specified H66.012 cefdinir (OMNICEF) 300 MG capsule     ofloxacin (FLOXIN) 0.3 % otic solution   2. Follow-up exam Z09         BMI:   Estimated body mass index is 25.3 kg/m  as calculated from the following:    Height as of 12/18/18: 1.664 m (5' 5.5\").    Weight as of this encounter: 70 kg (154 lb 6.4 oz).       See Patient Instructions: discussed use full course of antibiotic drops and oral abx.  Follow up if needed for persistent or worsening symptoms    Return in about 4 days (around 6/10/2019), or if symptoms worsen or fail to improve.    Kay Haney, " [facility]

## 2019-06-06 NOTE — PATIENT INSTRUCTIONS
Patient Education     Ruptured Infected Eardrum (Adult)    The middle ear is the space behind the eardrum. You have an infection of the middle ear. This can lead to pressure that causes the eardrum to break (rupture). This may cause sudden pain. Pus or blood will drain out of the ear canal. Your hearing will also likely be affected.  The infection may be treated with antibiotics. The eardrum usually heals completely on its own. If it does not, further treatment is needed. For this reason, it s important to have a follow-up exam with an ear specialist.  Home care    Keep taking prescribed antibiotics until all of the medicine is gone. Do this even if you feel better after the first few days.    Take any other medicines as prescribed.    Keep a clean cotton ball in the ear canal to absorb drainage. Change the cotton often, when it becomes soiled with fluid drainage. Don t let water get into the ear. Don t put any medicine drops into the ear unless your healthcare provider tells you to do so.  Follow-up care  Follow up with your healthcare provider in 2 weeks, or as advised. This is to make sure the infection is getting better and the eardrum is healing. Also follow up with specialists as advised for a hearing test or exam.  When to seek medical advice  Call your healthcare provider if you have any of the following:    Fever of 100.4 F (38 C) or higher    Pain that gets worse or doesn t get better    Unusual drowsiness or confusion    Headache, neck pain or a stiff neck    Convulsions (seizure)    Worsening dizziness    Worsening hearing loss or ringing in the ear  Date Last Reviewed: 10/1/2017    [id number] [employer]. [address] [place] [address], [place] [address]. All rights reserved. This information is not intended as a substitute for professional medical care. Always follow your healthcare professional's instructions.

## 2019-09-15 ENCOUNTER — NURSE TRIAGE (OUTPATIENT)
Dept: NURSING | Facility: CLINIC | Age: 24
End: 2019-09-15

## 2019-09-15 ENCOUNTER — TRANSFERRED RECORDS (OUTPATIENT)
Dept: HEALTH INFORMATION MANAGEMENT | Facility: CLINIC | Age: 24
End: 2019-09-15

## 2019-09-15 NOTE — TELEPHONE ENCOUNTER
Pt was stung by a bee on her arm and the elbow to her wrist is swollen. Very itchy, hard to sleep last night even with benadryl. Advised she she seen this am for evaluation and treatment as needed.    Bonita Lester RN, [facility]      Reason for Disposition    [1] Red streak or red line AND [2] length > 2 inches (5 cm)    Additional Information    Negative: [1] Life-threatening reaction (anaphylaxis) in the past to sting AND [2] < 2 hours since sting    Negative: Attacked by swarm of bees now    Negative: Passed out (i.e., lost consciousness, collapsed and was not responding)    Negative: Wheezing, stridor, or difficulty breathing    Negative: [1] Hoarseness or cough AND [2] sudden onset following sting    Negative: [1] Tightness in the throat or chest AND [2] sudden onset following sting    Negative: [1] Swollen tongue or difficulty swallowing AND [2] sudden onset following sting    Negative: Sounds like a life-threatening emergency to the triager    Negative: Not a bee, wasp, hornet, or yellow jacket sting    Negative: [1] Widespread hives, itching or facial swelling AND [2] started within 2 hours of sting (Exception: only at site of sting)    Negative: [1] Vomiting or abdominal cramps AND [2] started within 2 hours of sting    Negative: [1] Gave epinephrine shot AND [2] no symptoms now    Negative: Sting inside the mouth    Negative: Sting on eyeball (e.g., cornea)    Negative: Patient sounds very sick or weak to the triager    Negative: More than 50 stings    Negative: [1] Fever AND [2] red area    Negative: [1] Fever AND [2] area is very tender to touch    Protocols used: BEE OR YELLOW JACKET STING-A-

## 2019-10-07 ENCOUNTER — TELEPHONE (OUTPATIENT)
Dept: FAMILY MEDICINE | Facility: CLINIC | Age: 24
End: 2019-10-07

## 2019-10-07 NOTE — TELEPHONE ENCOUNTER
Panel Management Review  Summary:    Patient is due/failing the following:   GC annual screening  Health Maintenance Due   Topic Date Due     HIV SCREENING  03/08/2010     CHLAMYDIA SCREENING  11/24/2018     PHQ-2  01/01/2019     INFLUENZA VACCINE (1) 09/01/2019     PAP  09/22/2019        Type of outreach:    please call patient and assist with scheduling.                                                                                                                    Kay Haney NP    Chart routed to Care Team .

## 2019-10-11 NOTE — TELEPHONE ENCOUNTER
Type of outreach:    Phone, left message for patient to call back.     Patient is due/failing the following:   Pap only appointment  GC annual screening       Health Maintenance Due   Topic Date Due     HIV SCREENING  03/08/2010     CHLAMYDIA SCREENING  11/24/2018     PHQ-2  01/01/2019     INFLUENZA VACCINE (1) 09/01/2019     PAP  09/22/2019

## 2019-12-03 ENCOUNTER — OFFICE VISIT (OUTPATIENT)
Dept: OBGYN | Facility: CLINIC | Age: 24
End: 2019-12-03
Payer: COMMERCIAL

## 2019-12-03 ENCOUNTER — RESULT FOLLOW UP (OUTPATIENT)
Dept: OBGYN | Facility: CLINIC | Age: 24
End: 2019-12-03

## 2019-12-03 VITALS
TEMPERATURE: 98.3 F | WEIGHT: 160.2 LBS | DIASTOLIC BLOOD PRESSURE: 83 MMHG | OXYGEN SATURATION: 98 % | HEART RATE: 80 BPM | BODY MASS INDEX: 26.69 KG/M2 | SYSTOLIC BLOOD PRESSURE: 129 MMHG | HEIGHT: 65 IN

## 2019-12-03 DIAGNOSIS — Z11.3 SCREEN FOR STD (SEXUALLY TRANSMITTED DISEASE): ICD-10-CM

## 2019-12-03 DIAGNOSIS — Z01.419 ENCOUNTER FOR GYNECOLOGICAL EXAMINATION WITHOUT ABNORMAL FINDING: Primary | ICD-10-CM

## 2019-12-03 DIAGNOSIS — R87.610 ATYPICAL SQUAMOUS CELLS OF UNDETERMINED SIGNIFICANCE (ASCUS) ON PAPANICOLAOU SMEAR OF CERVIX: ICD-10-CM

## 2019-12-03 DIAGNOSIS — N94.6 DYSMENORRHEA: ICD-10-CM

## 2019-12-03 PROCEDURE — G0124 SCREEN C/V THIN LAYER BY MD: HCPCS | Performed by: NURSE PRACTITIONER

## 2019-12-03 PROCEDURE — 99395 PREV VISIT EST AGE 18-39: CPT | Performed by: NURSE PRACTITIONER

## 2019-12-03 PROCEDURE — 87591 N.GONORRHOEAE DNA AMP PROB: CPT | Performed by: NURSE PRACTITIONER

## 2019-12-03 PROCEDURE — 87491 CHLMYD TRACH DNA AMP PROBE: CPT | Performed by: NURSE PRACTITIONER

## 2019-12-03 PROCEDURE — G0145 SCR C/V CYTO,THINLAYER,RESCR: HCPCS | Performed by: NURSE PRACTITIONER

## 2019-12-03 RX ORDER — DROSPIRENONE AND ETHINYL ESTRADIOL 0.03MG-3MG
1 KIT ORAL DAILY
Qty: 84 TABLET | Refills: 3 | Status: SHIPPED | OUTPATIENT
Start: 2019-12-03 | End: 2020-11-16

## 2019-12-03 ASSESSMENT — PAIN SCALES - GENERAL: PAINLEVEL: NO PAIN (0)

## 2019-12-03 ASSESSMENT — MIFFLIN-ST. JEOR: SCORE: 1469.6

## 2019-12-03 NOTE — PROGRESS NOTES
SUBJECTIVE:   CC: Nina Washington is an 24 year old woman who presents for preventive health visit.     Healthy Habits:     Getting at least 3 servings of Calcium per day:  Yes    Bi-annual eye exam:  Yes    Dental care twice a year:  Yes    Sleep apnea or symptoms of sleep apnea:  None    Diet:  Regular (no restrictions)    Frequency of exercise:  2-3 days/week    Duration of exercise:  Other    Taking medications regularly:  Yes    Medication side effects:  Not applicable    PHQ-2 Total Score: 0    Additional concerns today:  No        Today's PHQ-2 Score:   PHQ-2 ( 1999 Pfizer) 12/3/2019   Q1: Little interest or pleasure in doing things 0   Q2: Feeling down, depressed or hopeless 0   PHQ-2 Score 0   Q1: Little interest or pleasure in doing things Not at all   Q2: Feeling down, depressed or hopeless Not at all   PHQ-2 Score 0       Abuse: Current or Past(Physical, Sexual or Emotional)- No  Do you feel safe in your environment? Yes        Social History     Tobacco Use     Smoking status: Never Smoker     Smokeless tobacco: Never Used   Substance Use Topics     Alcohol use: No         Alcohol Use 12/3/2019   Prescreen: >3 drinks/day or >7 drinks/week? No   Prescreen: >3 drinks/day or >7 drinks/week? -   No flowsheet data found.    Reviewed orders with patient.  Reviewed health maintenance and updated orders accordingly - Yes  Patient Active Problem List   Diagnosis     Dysmenorrhea     Past Surgical History:   Procedure Laterality Date     wisdom teeth  2016       Social History     Tobacco Use     Smoking status: Never Smoker     Smokeless tobacco: Never Used   Substance Use Topics     Alcohol use: No     Family History   Problem Relation Age of Onset     Hypertension Father      Allergies Father      Allergies Mother      Hypertension Paternal Grandfather      Prostate Cancer Paternal Grandfather      Cancer - colorectal Maternal Grandmother      Allergies Brother      Cancer Maternal Grandfather       "      Mammogram not appropriate for this patient based on age.    Pertinent mammograms are reviewed under the imaging tab.  History of abnormal Pap smear: NO - age 21-29 PAP every 3 years recommended  PAP / HPV 9/22/2016   PAP NIL     Reviewed and updated as needed this visit by clinical staff  Tobacco  Allergies  Meds  Med Hx  Surg Hx  Fam Hx  Soc Hx        Reviewed and updated as needed this visit by Provider        Past Medical History:   Diagnosis Date     Dysmenorrhea       Past Surgical History:   Procedure Laterality Date     wisdom teeth  2016       Review of Systems  CONSTITUTIONAL: NEGATIVE for fever, chills, change in weight  INTEGUMENTARU/SKIN: NEGATIVE for worrisome rashes, moles or lesions  EYES: NEGATIVE for vision changes or irritation  ENT: NEGATIVE for ear, mouth and throat problems  RESP: NEGATIVE for significant cough or SOB  BREAST: NEGATIVE for masses, tenderness or discharge  CV: NEGATIVE for chest pain, palpitations or peripheral edema  GI: NEGATIVE for nausea, abdominal pain, heartburn, or change in bowel habits  : NEGATIVE for unusual urinary or vaginal symptoms. Periods are regular.  MUSCULOSKELETAL: NEGATIVE for significant arthralgias or myalgia  NEURO: NEGATIVE for weakness, dizziness or paresthesias  PSYCHIATRIC: NEGATIVE for changes in mood or affect     OBJECTIVE:   /83 (BP Location: Right arm, Patient Position: Sitting, Cuff Size: Adult Regular)   Pulse 80   Temp 98.3  F (36.8  C) (Oral)   Ht 1.638 m (5' 4.5\")   Wt 72.7 kg (160 lb 3.2 oz)   LMP 11/28/2019 (Exact Date)   SpO2 98%   BMI 27.07 kg/m    Physical Exam  GENERAL: healthy, alert and no distress  EYES: Eyes grossly normal to inspection, PERRL and conjunctivae and sclerae normal  HENT: ear canals and TM's normal, nose and mouth without ulcers or lesions  NECK: no adenopathy, no asymmetry, masses, or scars and thyroid normal to palpation  RESP: lungs clear to auscultation - no rales, rhonchi or " "wheezes  BREAST: normal without masses, tenderness or nipple discharge and no palpable axillary masses or adenopathy  CV: regular rate and rhythm, normal S1 S2, no S3 or S4, no murmur, click or rub, no peripheral edema and peripheral pulses strong  ABDOMEN: soft, nontender, no hepatosplenomegaly, no masses and bowel sounds normal   (female): normal female external genitalia, normal urethral meatus, vaginal mucosa pink, moist, well rugated, and normal cervix/adnexa/uterus without masses or discharge  MS: no gross musculoskeletal defects noted, no edema  SKIN: no suspicious lesions or rashes  NEURO: Normal strength and tone, mentation intact and speech normal  PSYCH: mentation appears normal, affect normal/bright    ASSESSMENT/PLAN:   1. Encounter for gynecological examination without abnormal finding  - Pap imaged thin layer screen only - recommended age 21 - 24 years    2. Screen for STD (sexually transmitted disease)  - Chlamydia trachomatis PCR  - Neisseria gonorrhoeae PCR    3. Dysmenorrhea  Well managed with current oral contraceptive pill, refill x 1 year.  - drospirenone-ethinyl estradiol (AUSTEN) 3-0.03 MG tablet; Take 1 tablet by mouth daily  Dispense: 84 tablet; Refill: 3    COUNSELING:  Reviewed preventive health counseling, as reflected in patient instructions  Special attention given to:        Regular exercise       Healthy diet/nutrition       Contraception       Safe sex practices/STD prevention       HIV screeninx in teen years, 1x in adult years, and at intervals if high risk    Estimated body mass index is 27.07 kg/m  as calculated from the following:    Height as of this encounter: 1.638 m (5' 4.5\").    Weight as of this encounter: 72.7 kg (160 lb 3.2 oz).         reports that she has never smoked. She has never used smokeless tobacco.      Counseling Resources:  ATP IV Guidelines  Pooled Cohorts Equation Calculator  Breast Cancer Risk Calculator  FRAX Risk Assessment  ICSI Preventive " Guidelines  Dietary Guidelines for Americans, 2010  USDA's MyPlate  ASA Prophylaxis  Lung CA Screening    BANDAR Bennett [facility]

## 2019-12-03 NOTE — LETTER
December 17, 2019      Nina Washington  [address] [facility] [address]    Dear ,      This letter is in regards to your recent cervical cancer screening (Pap smear). Your Pap smear result was reported as ASCUS or Atypical Squamous Cells of Undetermined Significance. This means that there were mildly abnormal cells found in the sample that we collected from your cervix. The vast majority of patients with this result do not have significant cervical abnormalities.     Therefore, current guidelines recommend that you return in one year to repeat your Pap smear.      If you have additional questions regarding this result, please call our registered nurse, Rosa at [phone].    Sincerely,      BANDAR Bennett CNP/rlm

## 2019-12-04 LAB
C TRACH DNA SPEC QL NAA+PROBE: NEGATIVE
N GONORRHOEA DNA SPEC QL NAA+PROBE: NEGATIVE
SPECIMEN SOURCE: NORMAL
SPECIMEN SOURCE: NORMAL

## 2019-12-09 LAB
COPATH REPORT: ABNORMAL
PAP: ABNORMAL

## 2019-12-09 NOTE — PROGRESS NOTES
12/3/19 ASCUS Pap (25 yo). Plan pap only in 1 year.   12/9/19 Result released to [employer].   12/17/19 Result letter sent per RN request ([email or web address])

## 2020-11-16 ENCOUNTER — MYC REFILL (OUTPATIENT)
Dept: OBGYN | Facility: CLINIC | Age: 25
End: 2020-11-16

## 2020-11-16 DIAGNOSIS — N94.6 DYSMENORRHEA: ICD-10-CM

## 2020-11-16 RX ORDER — DROSPIRENONE AND ETHINYL ESTRADIOL 0.03MG-3MG
1 KIT ORAL DAILY
Qty: 84 TABLET | Refills: 0 | Status: SHIPPED | OUTPATIENT
Start: 2020-11-16 | End: 2020-12-15

## 2020-11-16 NOTE — TELEPHONE ENCOUNTER
"Requested Prescriptions   Pending Prescriptions Disp Refills     drospirenone-ethinyl estradiol (AUSTEN) 3-0.03 MG tablet 84 tablet 3     Sig: Take 1 tablet by mouth daily       Contraceptives Protocol Passed - 11/16/2020  8:56 AM        Passed - Patient is not a current smoker if age is 35 or older        Passed - Recent (12 mo) or future (30 days) visit within the authorizing provider's specialty     Patient has had an office visit with the authorizing provider or a provider within the authorizing providers department within the previous 12 mos or has a future within next 30 days. See \"Patient Info\" tab in inbasket, or \"Choose Columns\" in Meds & Orders section of the refill encounter.              Passed - Medication is active on med list        Passed - No active pregnancy on record        Passed - No positive pregnancy test in past 12 months           Prescription approved per [facility] [place].    Sarah Fang RN    "

## 2020-12-12 ENCOUNTER — HEALTH MAINTENANCE LETTER (OUTPATIENT)
Age: 25
End: 2020-12-12

## 2020-12-15 ENCOUNTER — OFFICE VISIT (OUTPATIENT)
Dept: OBGYN | Facility: CLINIC | Age: 25
End: 2020-12-15
Payer: COMMERCIAL

## 2020-12-15 VITALS
HEIGHT: 65 IN | TEMPERATURE: 97.9 F | DIASTOLIC BLOOD PRESSURE: 86 MMHG | OXYGEN SATURATION: 96 % | HEART RATE: 98 BPM | BODY MASS INDEX: 29.59 KG/M2 | WEIGHT: 177.6 LBS | SYSTOLIC BLOOD PRESSURE: 133 MMHG

## 2020-12-15 DIAGNOSIS — N94.6 DYSMENORRHEA: ICD-10-CM

## 2020-12-15 DIAGNOSIS — R87.610 ATYPICAL SQUAMOUS CELLS OF UNDETERMINED SIGNIFICANCE (ASCUS) ON PAPANICOLAOU SMEAR OF CERVIX: ICD-10-CM

## 2020-12-15 DIAGNOSIS — Z11.59 NEED FOR HEPATITIS C SCREENING TEST: ICD-10-CM

## 2020-12-15 DIAGNOSIS — R89.9 ABNORMAL LABORATORY TEST RESULT: ICD-10-CM

## 2020-12-15 DIAGNOSIS — Z83.49 FAMILY HISTORY OF THYROID DISEASE: ICD-10-CM

## 2020-12-15 DIAGNOSIS — Z01.419 ENCOUNTER FOR GYNECOLOGICAL EXAMINATION WITHOUT ABNORMAL FINDING: Primary | ICD-10-CM

## 2020-12-15 LAB
T4 FREE SERPL-MCNC: 1.1 NG/DL (ref 0.76–1.46)
TSH SERPL DL<=0.005 MIU/L-ACNC: 4.56 MU/L (ref 0.4–4)

## 2020-12-15 PROCEDURE — 84439 ASSAY OF FREE THYROXINE: CPT | Performed by: NURSE PRACTITIONER

## 2020-12-15 PROCEDURE — 84443 ASSAY THYROID STIM HORMONE: CPT | Performed by: NURSE PRACTITIONER

## 2020-12-15 PROCEDURE — 86803 HEPATITIS C AB TEST: CPT | Performed by: NURSE PRACTITIONER

## 2020-12-15 PROCEDURE — 99395 PREV VISIT EST AGE 18-39: CPT | Performed by: NURSE PRACTITIONER

## 2020-12-15 PROCEDURE — 36415 COLL VENOUS BLD VENIPUNCTURE: CPT | Performed by: NURSE PRACTITIONER

## 2020-12-15 RX ORDER — DROSPIRENONE AND ETHINYL ESTRADIOL 0.03MG-3MG
1 KIT ORAL DAILY
Qty: 84 TABLET | Refills: 3 | Status: SHIPPED | OUTPATIENT
Start: 2020-12-15 | End: 2021-12-16

## 2020-12-15 ASSESSMENT — MIFFLIN-ST. JEOR: SCORE: 1555.43

## 2020-12-15 ASSESSMENT — PAIN SCALES - GENERAL: PAINLEVEL: NO PAIN (0)

## 2020-12-15 NOTE — PROGRESS NOTES
SUBJECTIVE:   CC: Nina Washington is an 25 year old woman who presents for preventive health visit.     {Healthy Habits:     Getting at least 3 servings of Calcium per day:  Yes    Bi-annual eye exam:  Yes    Dental care twice a year:  Yes    Sleep apnea or symptoms of sleep apnea:  None    Diet:  Regular (no restrictions)    Frequency of exercise:  2-3 days/week    Duration of exercise:  15-30 minutes    Taking medications regularly:  Yes    Medication side effects:  None    PHQ-2 Total Score: 0    Additional concerns today:  No    Mom has history of thyroid disorder, would like testing today.    Today's PHQ-2 Score:   PHQ-2 ( 1999 Pfizer) 12/15/2020   Q1: Little interest or pleasure in doing things 0   Q2: Feeling down, depressed or hopeless 0   PHQ-2 Score 0   Q1: Little interest or pleasure in doing things Not at all   Q2: Feeling down, depressed or hopeless Not at all   PHQ-2 Score 0       Abuse: Current or Past (Physical, Sexual or Emotional) - No  Do you feel safe in your environment? Yes        Social History     Tobacco Use     Smoking status: Never Smoker     Smokeless tobacco: Never Used   Substance Use Topics     Alcohol use: No         Alcohol Use 12/15/2020   Prescreen: >3 drinks/day or >7 drinks/week? No   Prescreen: >3 drinks/day or >7 drinks/week? -   No flowsheet data found.    Reviewed orders with patient.  Reviewed health maintenance and updated orders accordingly - Yes  Patient Active Problem List   Diagnosis     Dysmenorrhea     Atypical squamous cells of undetermined significance (ASCUS) on Papanicolaou smear of cervix     Past Surgical History:   Procedure Laterality Date     wisdom teeth  2016       Social History     Tobacco Use     Smoking status: Never Smoker     Smokeless tobacco: Never Used   Substance Use Topics     Alcohol use: No     Family History   Problem Relation Age of Onset     Hypertension Father      Allergies Father      Allergies Mother      Hypertension Paternal  "Grandfather      Prostate Cancer Paternal Grandfather      Cancer - colorectal Maternal Grandmother      Allergies Brother      Cancer Maternal Grandfather            Mammogram not appropriate for this patient based on age.    Pertinent mammograms are reviewed under the imaging tab.  History of abnormal Pap smear: YES - updated in Problem List and Health Maintenance accordingly  PAP / HPV 12/3/2019 9/22/2016   PAP ASC-US(A) NIL     Reviewed and updated as needed this visit by clinical staff  Tobacco  Allergies  Meds   Med Hx  Surg Hx  Fam Hx  Soc Hx        Reviewed and updated as needed this visit by Provider                Past Medical History:   Diagnosis Date     Abnormal Pap smear of cervix 2019    see problem list     Dysmenorrhea       Past Surgical History:   Procedure Laterality Date     wisdom teeth  2016       Review of Systems  CONSTITUTIONAL: NEGATIVE for fever, chills, change in weight  INTEGUMENTARU/SKIN: NEGATIVE for worrisome rashes, moles or lesions  EYES: NEGATIVE for vision changes or irritation  ENT: NEGATIVE for ear, mouth and throat problems  RESP: NEGATIVE for significant cough or SOB  BREAST: NEGATIVE for masses, tenderness or discharge  CV: NEGATIVE for chest pain, palpitations or peripheral edema  GI: NEGATIVE for nausea, abdominal pain, heartburn, or change in bowel habits  : NEGATIVE for unusual urinary or vaginal symptoms. Periods are regular.  MUSCULOSKELETAL: NEGATIVE for significant arthralgias or myalgia  NEURO: NEGATIVE for weakness, dizziness or paresthesias  PSYCHIATRIC: NEGATIVE for changes in mood or affect     OBJECTIVE:   /86 (BP Location: Right arm, Patient Position: Sitting, Cuff Size: Adult Regular)   Pulse 98   Temp 97.9  F (36.6  C) (Tympanic)   Ht 1.657 m (5' 5.25\")   Wt 80.6 kg (177 lb 9.6 oz)   LMP 11/24/2020 (Approximate)   SpO2 96%   BMI 29.33 kg/m    Physical Exam  GENERAL: healthy, alert and no distress  EYES: Eyes grossly normal to inspection, " PERRL and conjunctivae and sclerae normal  HENT: ear canals and TM's normal  NECK: no adenopathy, no asymmetry, masses, or scars and thyroid normal to palpation  RESP: lungs clear to auscultation - no rales, rhonchi or wheezes  BREAST: normal without masses, tenderness or nipple discharge and no palpable axillary masses or adenopathy  CV: regular rate and rhythm, normal S1 S2, no S3 or S4, no murmur, click or rub, no peripheral edema and peripheral pulses strong  ABDOMEN: soft, nontender, no hepatosplenomegaly, no masses and bowel sounds normal   (female): normal female external genitalia, normal urethral meatus, vaginal mucosa pink, moist, well rugated, and normal cervix/adnexa/uterus without masses or discharge  MS: no gross musculoskeletal defects noted, no edema  SKIN: no suspicious lesions or rashes  NEURO: Normal strength and tone, mentation intact and speech normal  PSYCH: mentation appears normal, affect normal/bright    ASSESSMENT/PLAN:   1. Encounter for gynecological examination without abnormal finding  Health Maintenance updated.    2. Dysmenorrhea  Well managed overall with oral contraceptive pill, some mild cramping but much improved with this pill.  Refill x 1 year.   - drospirenone-ethinyl estradiol (AUSTEN) 3-0.03 MG tablet; Take 1 tablet by mouth daily  Dispense: 84 tablet; Refill: 3    3. Need for hepatitis C screening test  - Hepatitis C Screen Reflex to HCV RNA Quant and Genotype    4. Atypical squamous cells of undetermined significance (ASCUS) on Papanicolaou smear of cervix  Follow up based on result.  - Pap imaged thin layer diagnostic only    5. Family history of thyroid disease  - TSH with free T4 reflex    COUNSELING:  Reviewed preventive health counseling, as reflected in patient instructions  Special attention given to:        Regular exercise       Healthy diet/nutrition       Contraception       Consider Hep C screening for all patients one time for ages 18-79 years    Estimated  "body mass index is 29.33 kg/m  as calculated from the following:    Height as of this encounter: 1.657 m (5' 5.25\").    Weight as of this encounter: 80.6 kg (177 lb 9.6 oz).    Weight management plan: Discussed healthy diet and exercise guidelines    She reports that she has never smoked. She has never used smokeless tobacco.      Counseling Resources:  ATP IV Guidelines  Pooled Cohorts Equation Calculator  Breast Cancer Risk Calculator  BRCA-Related Cancer Risk Assessment: FHS-7 Tool  FRAX Risk Assessment  ICSI Preventive Guidelines  Dietary Guidelines for Americans, 2010  USDA's MyPlate  ASA Prophylaxis  Lung CA Screening    BANDAR Bennett CNP  [facility]  "

## 2020-12-16 LAB — HCV AB SERPL QL IA: NONREACTIVE

## 2020-12-17 ENCOUNTER — PATIENT OUTREACH (OUTPATIENT)
Dept: OBGYN | Facility: CLINIC | Age: 25
End: 2020-12-17
Payer: COMMERCIAL

## 2020-12-17 DIAGNOSIS — R87.610 ATYPICAL SQUAMOUS CELLS OF UNDETERMINED SIGNIFICANCE (ASCUS) ON PAPANICOLAOU SMEAR OF CERVIX: ICD-10-CM

## 2020-12-17 LAB
COPATH REPORT: NORMAL
PAP: NORMAL

## 2020-12-17 NOTE — TELEPHONE ENCOUNTER
2016 NIL Pap  12/3/19 ASCUS Pap (25 yo). Plan pap only in 1 year.   12/15/20 NIL Pap at 26 yo. Plan pap only in 1 year.   12/17/20 MyChart result note sent.

## 2021-04-19 ENCOUNTER — TELEPHONE (OUTPATIENT)
Dept: OBGYN | Facility: CLINIC | Age: 26
End: 2021-04-19

## 2021-04-19 NOTE — TELEPHONE ENCOUNTER
drospirenone-ethinyl estradiol (AUSTEN) 3-0.03 MG tablet was sent to [facility] in [place] on 12/15/2020 for an 84 day supply with 3 additional refills.    Spoke with pt and suggested she contact her pharmacy for a refill as she has refills left.  I did warn her that insurance may not cover it as they are going to say it is too soon to fill if she just filled it.  Pt will contact her pharmacy and deal with insurance regarding getting another pack of BCP since she thinks she threw the one she just picked up away.  Pt is concerned that she will not have enough refills to get her through to her annual exam since she threw a pack away.  Suggested she contact us towards the end of the year when she picks up her last refill and we can give her a graciela refill to get her through to her annual exam.    Pt verbalized understanding and agreed to plan.    Sarah Fang RN

## 2021-04-19 NOTE — TELEPHONE ENCOUNTER
Patient accidentally threw away her birth control pack. She is requesting a new pack please.Please call to advise.  Majo Mcghee TC

## 2021-09-26 ENCOUNTER — HEALTH MAINTENANCE LETTER (OUTPATIENT)
Age: 26
End: 2021-09-26

## 2021-12-02 PROBLEM — R87.610 ATYPICAL SQUAMOUS CELLS OF UNDETERMINED SIGNIFICANCE (ASCUS) ON PAPANICOLAOU SMEAR OF CERVIX: Status: ACTIVE | Noted: 2019-12-03

## 2021-12-16 ENCOUNTER — OFFICE VISIT (OUTPATIENT)
Dept: OBGYN | Facility: CLINIC | Age: 26
End: 2021-12-16
Payer: COMMERCIAL

## 2021-12-16 VITALS
OXYGEN SATURATION: 94 % | SYSTOLIC BLOOD PRESSURE: 127 MMHG | DIASTOLIC BLOOD PRESSURE: 81 MMHG | HEIGHT: 65 IN | BODY MASS INDEX: 31.16 KG/M2 | WEIGHT: 187 LBS | HEART RATE: 87 BPM

## 2021-12-16 DIAGNOSIS — Z01.419 ENCOUNTER FOR GYNECOLOGICAL EXAMINATION WITHOUT ABNORMAL FINDING: Primary | ICD-10-CM

## 2021-12-16 DIAGNOSIS — R87.610 ATYPICAL SQUAMOUS CELLS OF UNDETERMINED SIGNIFICANCE (ASCUS) ON PAPANICOLAOU SMEAR OF CERVIX: ICD-10-CM

## 2021-12-16 DIAGNOSIS — N94.6 DYSMENORRHEA: ICD-10-CM

## 2021-12-16 PROCEDURE — 99395 PREV VISIT EST AGE 18-39: CPT | Performed by: NURSE PRACTITIONER

## 2021-12-16 PROCEDURE — 87624 HPV HI-RISK TYP POOLED RSLT: CPT | Performed by: NURSE PRACTITIONER

## 2021-12-16 PROCEDURE — 88175 CYTOPATH C/V AUTO FLUID REDO: CPT | Performed by: NURSE PRACTITIONER

## 2021-12-16 RX ORDER — DROSPIRENONE AND ETHINYL ESTRADIOL 0.03MG-3MG
1 KIT ORAL DAILY
Qty: 84 TABLET | Refills: 3 | Status: SHIPPED | OUTPATIENT
Start: 2021-12-16 | End: 2022-11-22

## 2021-12-16 ASSESSMENT — MIFFLIN-ST. JEOR: SCORE: 1589.11

## 2021-12-16 ASSESSMENT — PAIN SCALES - GENERAL: PAINLEVEL: NO PAIN (0)

## 2021-12-16 NOTE — PROGRESS NOTES
SUBJECTIVE:   CC: Nina Washington is an 26 year old woman who presents for preventive health visit.     {Healthy Habits:     Getting at least 3 servings of Calcium per day:  Yes    Bi-annual eye exam:  Yes    Dental care twice a year:  Yes    Sleep apnea or symptoms of sleep apnea:  None    Diet:  Regular (no restrictions)    Frequency of exercise:  2-3 days/week    Duration of exercise:  15-30 minutes    Taking medications regularly:  Yes    Medication side effects:  Not applicable    PHQ-2 Total Score: 0    Additional concerns today:  No        Today's PHQ-2 Score:   PHQ-2 ( 1999 Pfizer) 12/16/2021   Q1: Little interest or pleasure in doing things 0   Q2: Feeling down, depressed or hopeless 0   PHQ-2 Score 0   PHQ-2 Total Score (12-17 Years)- Positive if 3 or more points; Administer PHQ-A if positive -   Q1: Little interest or pleasure in doing things Not at all   Q2: Feeling down, depressed or hopeless Not at all   PHQ-2 Score 0       Abuse: Current or Past (Physical, Sexual or Emotional) - No  Do you feel safe in your environment? Yes      Social History     Tobacco Use     Smoking status: Never Smoker     Smokeless tobacco: Never Used   Substance Use Topics     Alcohol use: No         Alcohol Use 12/16/2021   Prescreen: >3 drinks/day or >7 drinks/week? No   Prescreen: >3 drinks/day or >7 drinks/week? -   No flowsheet data found.    Reviewed orders with patient.  Reviewed health maintenance and updated orders accordingly - Yes  Patient Active Problem List   Diagnosis     Dysmenorrhea     Atypical squamous cells of undetermined significance (ASCUS) on Papanicolaou smear of cervix     Past Surgical History:   Procedure Laterality Date     HIP SURGERY Right 05/19/2021     wisdom teeth  2016       Social History     Tobacco Use     Smoking status: Never Smoker     Smokeless tobacco: Never Used   Substance Use Topics     Alcohol use: No     Family History   Problem Relation Age of Onset     Hypertension Father       Allergies Father      Allergies Mother      Hypertension Paternal Grandfather      Prostate Cancer Paternal Grandfather      Cancer - colorectal Maternal Grandmother      Allergies Brother      Cancer Maternal Grandfather            Breast Cancer Screening:  Any new diagnosis of family breast, ovarian, or bowel cancer? No    Patient under 40 years of age: Routine Mammogram Screening not recommended.   Pertinent mammograms are reviewed under the imaging tab.    History of abnormal Pap smear: YES - updated in Problem List and Health Maintenance accordingly  PAP / HPV 12/15/2020 12/3/2019 9/22/2016   PAP (Historical) NIL ASC-US(A) NIL     Reviewed and updated as needed this visit by clinical staff  Tobacco  Allergies  Meds  Problems  Med Hx  Surg Hx  Fam Hx  Soc Hx         Reviewed and updated as needed this visit by Provider     Problems           Past Medical History:   Diagnosis Date     Abnormal Pap smear of cervix 2019    see problem list     Dysmenorrhea       Past Surgical History:   Procedure Laterality Date     HIP SURGERY Right 05/19/2021     wisdom teeth  2016       Review of Systems  CONSTITUTIONAL: NEGATIVE for fever, chills, change in weight  INTEGUMENTARU/SKIN: NEGATIVE for worrisome rashes, moles or lesions  EYES: NEGATIVE for vision changes or irritation  ENT: NEGATIVE for ear, mouth and throat problems  RESP: NEGATIVE for significant cough or SOB  BREAST: NEGATIVE for masses, tenderness or discharge  CV: NEGATIVE for chest pain, palpitations or peripheral edema  GI: NEGATIVE for nausea, abdominal pain, heartburn, or change in bowel habits  : NEGATIVE for unusual urinary or vaginal symptoms. Periods are regular.  MUSCULOSKELETAL: NEGATIVE for significant arthralgias or myalgia  NEURO: NEGATIVE for weakness, dizziness or paresthesias  PSYCHIATRIC: NEGATIVE for changes in mood or affect     OBJECTIVE:   /81 (BP Location: Right arm, Patient Position: Sitting, Cuff Size: Adult Large)   " Pulse 87   Ht 1.651 m (5' 5\")   Wt 84.8 kg (187 lb)   LMP 11/25/2021 (Approximate)   SpO2 94%   BMI 31.12 kg/m    Physical Exam  GENERAL: healthy, alert and no distress  EYES: Eyes grossly normal to inspection, PERRL and conjunctivae and sclerae normal  HENT: ear canals and TM's normal  NECK: no adenopathy, no asymmetry, masses, or scars and thyroid normal to palpation  RESP: lungs clear to auscultation - no rales, rhonchi or wheezes  BREAST: normal without masses, tenderness or nipple discharge and no palpable axillary masses or adenopathy  CV: regular rate and rhythm, normal S1 S2, no S3 or S4, no murmur, click or rub, no peripheral edema and peripheral pulses strong  ABDOMEN: soft, nontender, no hepatosplenomegaly, no masses and bowel sounds normal   (female): normal female external genitalia, normal urethral meatus, vaginal mucosa pink, moist, well rugated, and normal cervix/adnexa/uterus without masses or discharge  MS: no gross musculoskeletal defects noted, no edema  SKIN: no suspicious lesions or rashes  NEURO: Normal strength and tone, mentation intact and speech normal  PSYCH: mentation appears normal, affect normal/bright    ASSESSMENT/PLAN:   (Z01.419) Encounter for gynecological examination without abnormal finding  (primary encounter diagnosis)  Comment: Health maintenance reviewed    (N94.6) Dysmenorrhea  Comment: Well managed with oral contraceptive pill, refill x 1 year.  Plan: drospirenone-ethinyl estradiol (AUSTEN) 3-0.03         MG tablet          (R87.610) Atypical squamous cells of undetermined significance (ASCUS) on Papanicolaou smear of cervix  Plan: Pap imaged thin layer diagnostic with HPV      COUNSELING:  Reviewed preventive health counseling, as reflected in patient instructions  Special attention given to:        Regular exercise       Healthy diet/nutrition       Contraception    Estimated body mass index is 31.12 kg/m  as calculated from the following:    Height as of this " "encounter: 1.651 m (5' 5\").    Weight as of this encounter: 84.8 kg (187 lb).    Weight management plan: Discussed healthy diet and exercise guidelines    She reports that she has never smoked. She has never used smokeless tobacco.      Counseling Resources:  ATP IV Guidelines  Pooled Cohorts Equation Calculator  Breast Cancer Risk Calculator  BRCA-Related Cancer Risk Assessment: FHS-7 Tool  FRAX Risk Assessment  ICSI Preventive Guidelines  Dietary Guidelines for Americans, 2010  USDA's MyPlate  ASA Prophylaxis  Lung CA Screening    BANDAR Bennett CNP  [facility]  "

## 2021-12-20 LAB
BKR LAB AP GYN ADEQUACY: NORMAL
BKR LAB AP GYN INTERPRETATION: NORMAL
BKR LAB AP HPV REFLEX: NORMAL
BKR LAB AP PREVIOUS ABNL DX: NORMAL
BKR LAB AP PREVIOUS ABNORMAL: NORMAL
PATH REPORT.COMMENTS IMP SPEC: NORMAL
PATH REPORT.COMMENTS IMP SPEC: NORMAL
PATH REPORT.RELEVANT HX SPEC: NORMAL

## 2021-12-22 LAB
HUMAN PAPILLOMA VIRUS 16 DNA: NEGATIVE
HUMAN PAPILLOMA VIRUS 18 DNA: NEGATIVE
HUMAN PAPILLOMA VIRUS FINAL DIAGNOSIS: ABNORMAL
HUMAN PAPILLOMA VIRUS OTHER HR: POSITIVE

## 2021-12-23 ENCOUNTER — PATIENT OUTREACH (OUTPATIENT)
Dept: OBGYN | Facility: CLINIC | Age: 26
End: 2021-12-23
Payer: COMMERCIAL

## 2021-12-23 DIAGNOSIS — R87.810 CERVICAL HIGH RISK HPV (HUMAN PAPILLOMAVIRUS) TEST POSITIVE: ICD-10-CM

## 2022-06-08 NOTE — PROGRESS NOTES
"Savana is a 27 year old who is being evaluated via a billable video visit.      How would you like to obtain your AVS? MyChart  If the video visit is dropped, the invitation should be resent by: Text to cell phone: [phone]  Will anyone else be joining your video visit? No      Video Start Time: 6:59am    Assessment & Plan     1. Constipation, unspecified constipation type    2. Diarrhea, unspecified type    3. Abdominal cramping        1,2) Most likely constipation induced IBS. Will check an abd/pelv CT to rule out diverticulitis/colitis, etc. We talked about finding a med or fiber regimen to get her BM's more regular. She will work on this and we'll follow up in 1 month.    Review of prior external note(s) from - [facility]  Ordering of each unique test         BMI:   Estimated body mass index is 31.12 kg/m  as calculated from the following:    Height as of 12/16/21: 1.651 m (5' 5\").    Weight as of 12/16/21: 84.8 kg (187 lb).       Return in about 1 month (around 7/9/2022) for constipation/bowel follow up, with Amanda, in person.    LIGIA Hart [facility] HAZEL Sawant is a 27 year old who presents for the following health issues     History of Present Illness       Reason for visit:  Flare ups of abdominal pain, nausea, diharrea. history of diverticulitis and colon issues run in family. fatigue (labs were checked and ok at [facility] a few weeks ago). Looking to establish a new PCP closer to home.  Symptom onset:  3-7 days ago  Symptoms include:  Constipation, diharrea (some blood in it), fatigue, nausea. bad flare up riky 3-6th.  Symptom intensity:  Severe  Symptom progression:  Improving  Had these symptoms before:  Yes  Has tried/received treatment for these symptoms:  No  What makes it worse:  I have these flare ups every month or so. I always assume its food poisoning but am thinking its something more chronic at this point. " Pepto and over the counter never helps; just have to wait it out usually.    She eats 2-3 servings of fruits and vegetables daily.She consumes 0 sweetened beverage(s) daily.She exercises with enough effort to increase her heart rate 10 to 19 minutes per day.  She exercises with enough effort to increase her heart rate 3 or less days per week.   She is taking medications regularly.     MGM had colon cancer twice - passed at age 68  Mom with hx of constipation, diverticulitis and colon polyps   Has episode every couple months  Last 1 days, maybe two  Episodes start pretty quick with abdominal cramping, nausea, then stool urgency, then diarrhea  RBR x1  Has a BM once daily to every other day, but small stools      Review of Systems   Constitutional, gi and gu systems are negative, except as otherwise noted.      Objective           Vitals:  No vitals were obtained today due to virtual visit.    Physical Exam   GENERAL: Healthy, alert and no distress  EYES: Eyes grossly normal to inspection.  No discharge or erythema, or obvious scleral/conjunctival abnormalities.  RESP: No audible wheeze, cough, or visible cyanosis.  No visible retractions or increased work of breathing.    SKIN: Visible skin clear. No significant rash, abnormal pigmentation or lesions.  NEURO: Cranial nerves grossly intact.  Mentation and speech appropriate for age.  PSYCH: Mentation appears normal, affect normal/bright, judgement and insight intact, normal speech and appearance well-groomed.          Video-Visit Details    Type of service:  Video Visit    Video End Time: 7:19am, 21 mins    Originating Location (pt. Location): Home    Distant Location (provider location):  [facility]     Platform used for Video Visit: [employer]

## 2022-06-09 ENCOUNTER — VIRTUAL VISIT (OUTPATIENT)
Dept: FAMILY MEDICINE | Facility: CLINIC | Age: 27
End: 2022-06-09
Payer: COMMERCIAL

## 2022-06-09 DIAGNOSIS — R10.9 ABDOMINAL CRAMPING: ICD-10-CM

## 2022-06-09 DIAGNOSIS — R19.7 DIARRHEA, UNSPECIFIED TYPE: ICD-10-CM

## 2022-06-09 DIAGNOSIS — K59.00 CONSTIPATION, UNSPECIFIED CONSTIPATION TYPE: Primary | ICD-10-CM

## 2022-06-09 PROCEDURE — 99203 OFFICE O/P NEW LOW 30 MIN: CPT | Mod: 95 | Performed by: PHYSICIAN ASSISTANT

## 2022-06-09 NOTE — PATIENT INSTRUCTIONS
Senna and Bisacodyl are stimulants - they'll help move things through  Traditional Medicinals Smooth Move Senna tea - can combine with honey or Truvia   Bisacodyl (Dulcolax): 5-10mg daily for maintenance; 10-20mg when constipated   Try a more regular dosing of a stimulant into your daily/weekly regimen     Incorporate these into your diet: Flax seed, apples, nuts, at least 60 ounces of water per day     Miralax is another option: half to one capful daily, twice daily, every other day, etc

## 2022-06-14 ENCOUNTER — MYC MEDICAL ADVICE (OUTPATIENT)
Dept: FAMILY MEDICINE | Facility: CLINIC | Age: 27
End: 2022-06-14
Payer: COMMERCIAL

## 2022-06-20 ENCOUNTER — TRANSFERRED RECORDS (OUTPATIENT)
Dept: HEALTH INFORMATION MANAGEMENT | Facility: CLINIC | Age: 27
End: 2022-06-20

## 2022-11-15 ENCOUNTER — MYC MEDICAL ADVICE (OUTPATIENT)
Dept: FAMILY MEDICINE | Facility: CLINIC | Age: 27
End: 2022-11-15

## 2022-11-15 NOTE — TELEPHONE ENCOUNTER
See patient's mychart message, mental health concern voiced.    PHQ-9 score:  No flowsheet data found.     I called and spoke to patient, says she's noticed feeling more fatigued and vaguely depressed over the past year.   Denies suicidal ideation.    Says her IBS symptoms are much better so she did not feel a need to follow up in a month for that as advised last June.    No in person visits available in the next month.   Scheduled video visit for 11/17.    Poonam Jackson RN  [facility]

## 2022-11-17 ENCOUNTER — VIRTUAL VISIT (OUTPATIENT)
Dept: FAMILY MEDICINE | Facility: CLINIC | Age: 27
End: 2022-11-17
Payer: COMMERCIAL

## 2022-11-17 DIAGNOSIS — F43.21 ADJUSTMENT DISORDER WITH DEPRESSED MOOD: Primary | ICD-10-CM

## 2022-11-17 PROCEDURE — 99214 OFFICE O/P EST MOD 30 MIN: CPT | Mod: 95 | Performed by: PHYSICIAN ASSISTANT

## 2022-11-17 RX ORDER — FLUOXETINE 10 MG/1
10 CAPSULE ORAL DAILY
Qty: 7 CAPSULE | Refills: 0 | Status: SHIPPED | OUTPATIENT
Start: 2022-11-17 | End: 2022-12-15

## 2022-11-17 ASSESSMENT — PATIENT HEALTH QUESTIONNAIRE - PHQ9: SUM OF ALL RESPONSES TO PHQ QUESTIONS 1-9: 15

## 2022-11-17 NOTE — PROGRESS NOTES
"Savana is a 27 year old who is being evaluated via a billable video visit.      How would you like to obtain your AVS? MyChart  If the video visit is dropped, the invitation should be resent by: Text to cell phone: [phone]  Will anyone else be joining your video visit? No          Assessment & Plan     1. Adjustment disorder with depressed mood        PHQ elevated. Start Prozac and titrate to 20mg daily. Follow up in 1 month.    I reemphasized the importance of a multi-faceted approach toward the treatment of depression including: regular exercise, adequate sleep, and a well-rounded, low-glycemic diet. If new or worsening symptoms, she will seek help immediately. Counseling recommended, referral placed.          BMI:   Estimated body mass index is 31.12 kg/m  as calculated from the following:    Height as of 12/16/21: 1.651 m (5' 5\").    Weight as of 12/16/21: 84.8 kg (187 lb).       Depression Screening Follow Up    PHQ 11/17/2022   PHQ-9 Total Score 15   Q9: Thoughts of better off dead/self-harm past 2 weeks Several days       Return in about 1 month (around 12/17/2022) for depression/anxiety follow up, a virtual visit (telephone or video).    LIGIA Hart [facility] HAZEL Sawant is a 27 year old, presenting for the following health issues:  Depression      HPI     Abnormal Mood Symptoms  Onset/Duration: x1 year  Description:   Depression (if yes, do PHQ-9): YES  Anxiety (if yes, do YADIEL-7): No  Accompanying Signs & Symptoms:  Still participating in activities that you used to enjoy: No  Fatigue: YES  Irritability: YES  Difficulty concentrating: YES  Changes in appetite: No  Problems with sleep: YES- sleeping a lot more   Heart racing/beating fast: YES- sometimes  Abnormally elevated, expansive, or irritable mood: No  Persistently increased activity or energy: No, pt has not energy   Thoughts of hurting yourself or others: No  History:  Recent stress or major life " event: YES- life  Prior depression or anxiety: None  Family history of depression or anxiety: YES- maternal mothers side  Alcohol/drug use: No  Difficulty sleeping: YES  Precipitating or alleviating factors: None  Therapies tried and outcome: none; pt would like to discuss medication and therapy     Depression on mom's side  Exercising once per week      No flowsheet data found.  No flowsheet data found.      Review of Systems   Constitutional, and psych systems are negative, except as otherwise noted.      Objective           Vitals:  No vitals were obtained today due to virtual visit.    Physical Exam   GENERAL: Healthy, alert and no distress  EYES: Eyes grossly normal to inspection.  No discharge or erythema, or obvious scleral/conjunctival abnormalities.  RESP: No audible wheeze, cough, or visible cyanosis.  No visible retractions or increased work of breathing.    SKIN: Visible skin clear. No significant rash, abnormal pigmentation or lesions.  NEURO: Cranial nerves grossly intact.  Mentation and speech appropriate for age.  PSYCH: Mentation appears normal, affect normal/bright, judgement and insight intact, normal speech and appearance well-groomed.          Video-Visit Details    Joined the call at 11/17/2022, 7:45:15 am.  Left the call at 11/17/2022, 8:01:27 am.  You were on the call for 16 minutes 11 seconds    Originating Location (pt. Location): Home        Distant Location (provider location):  Off-site    Platform used for Video Visit: [employer]

## 2022-11-22 DIAGNOSIS — N94.6 DYSMENORRHEA: ICD-10-CM

## 2022-11-22 RX ORDER — DROSPIRENONE AND ETHINYL ESTRADIOL 0.03MG-3MG
KIT ORAL
Qty: 84 TABLET | Refills: 0 | Status: SHIPPED | OUTPATIENT
Start: 2022-11-22 | End: 2023-01-20

## 2022-11-22 NOTE — TELEPHONE ENCOUNTER
"Requested Prescriptions   Pending Prescriptions Disp Refills     drospirenone-ethinyl estradiol (AUSTEN) 3-0.03 MG tablet [Pharmacy Med Name: Drospirenone-Ethinyl Estradiol 3-0.03 MG Oral Tablet] 84 tablet 0     Sig: Take 1 tablet by mouth once daily       Contraceptives Protocol Passed - 11/22/2022 11:05 AM        Passed - Patient is not a current smoker if age is 35 or older        Passed - Recent (12 mo) or future (30 days) visit within the authorizing provider's specialty     Patient has had an office visit with the authorizing provider or a provider within the authorizing providers department within the previous 12 mos or has a future within next 30 days. See \"Patient Info\" tab in inbasket, or \"Choose Columns\" in Meds & Orders section of the refill encounter.              Passed - Medication is active on med list        Passed - No active pregnancy on record        Passed - No positive pregnancy test in past 12 months           Pt last seen 12/16/2022 for annual exam    Last prescribed 12/16/2022 for 84 tablets with 3 refills    Prescription approved per [facility] Refill Protocol.    RN sent graciela refill and reminder to schedule appt.    Romi Gamino RN on 11/22/2022 at 11:08 AM    "

## 2022-12-15 ENCOUNTER — VIRTUAL VISIT (OUTPATIENT)
Dept: FAMILY MEDICINE | Facility: CLINIC | Age: 27
End: 2022-12-15
Payer: COMMERCIAL

## 2022-12-15 DIAGNOSIS — F43.23 ADJUSTMENT DISORDER WITH MIXED ANXIETY AND DEPRESSED MOOD: Primary | ICD-10-CM

## 2022-12-15 PROCEDURE — 99213 OFFICE O/P EST LOW 20 MIN: CPT | Mod: 95 | Performed by: PHYSICIAN ASSISTANT

## 2022-12-15 RX ORDER — HYDROXYZINE HYDROCHLORIDE 25 MG/1
12.5-5 TABLET, FILM COATED ORAL EVERY 4 HOURS PRN
Qty: 40 TABLET | Refills: 1 | Status: SHIPPED | OUTPATIENT
Start: 2022-12-15

## 2022-12-15 ASSESSMENT — PATIENT HEALTH QUESTIONNAIRE - PHQ9
SUM OF ALL RESPONSES TO PHQ QUESTIONS 1-9: 15
10. IF YOU CHECKED OFF ANY PROBLEMS, HOW DIFFICULT HAVE THESE PROBLEMS MADE IT FOR YOU TO DO YOUR WORK, TAKE CARE OF THINGS AT HOME, OR GET ALONG WITH OTHER PEOPLE: SOMEWHAT DIFFICULT
SUM OF ALL RESPONSES TO PHQ QUESTIONS 1-9: 15

## 2022-12-15 NOTE — PROGRESS NOTES
"Savana is a 27 year old who is being evaluated via a billable video visit.      How would you like to obtain your AVS? MyChart  If the video visit is dropped, the invitation should be resent by: Text to cell phone: [phone]  Will anyone else be joining your video visit? No          Assessment & Plan       ICD-10-CM    1. Adjustment disorder with mixed anxiety and depressed mood  F43.23 FLUoxetine (PROZAC) 20 MG capsule     hydrOXYzine (ATARAX) 25 MG tablet          Continue Prozac, no changes today. I believe her lack of improvement is due to the situational stressors she has encountered over the last month. Will trial hydroxyzine for anxiety during the day and for sleep at night. Follow up in 1 month to reassess.     Patient Instructions   Hydroxyzine:  For anxiety during the day: 12.5-50mg every 4-6 hours as needed  For sleep at night: 50-100mg 30-60 mins before bed as needed      Prescription drug management         BMI:   Estimated body mass index is 31.12 kg/m  as calculated from the following:    Height as of 12/16/21: 1.651 m (5' 5\").    Weight as of 12/16/21: 84.8 kg (187 lb).     Depression Screening Follow Up    PHQ 12/15/2022   PHQ-9 Total Score 15   Q9: Thoughts of better off dead/self-harm past 2 weeks Not at all       Return in about 1 month (around 1/15/2023) for depression/anxiety follow up, a virtual visit (telephone or video).    Amnada Rivas PA-C  [facility] HAZEL Sawant is a 27 year old, presenting for the following health issues:  Depression      History of Present Illness       Mental Health Follow-up:  Patient presents to follow-up on Depression.Patient's depression since last visit has been:  No change  The patient is not having other symptoms associated with depression.      Any significant life events: relationship concerns and housing concerns  Patient is not feeling anxious or having panic attacks.  Patient has no concerns about alcohol or drug " use.    She eats 2-3 servings of fruits and vegetables daily.She consumes 0 sweetened beverage(s) daily.She exercises with enough effort to increase her heart rate 10 to 19 minutes per day.  She exercises with enough effort to increase her heart rate 3 or less days per week.   She is taking medications regularly.    Today's PHQ-9         PHQ-9 Total Score: 15    PHQ-9 Q9 Thoughts of better off dead/self-harm past 2 weeks :   Not at all    How difficult have these problems made it for you to do your work, take care of things at home, or get along with other people: Somewhat difficult       Started Prozac ~1 month ago  A lot happened in the last month: broke up with bf, had Covid, trying to evict roommate  Not sure if the increased anxiety is due to the medication or these situational stressors      Review of Systems   Constitutional, and psych systems are negative, except as otherwise noted.      Objective           Vitals:  No vitals were obtained today due to virtual visit.    Physical Exam   GENERAL: Healthy, alert and no distress  EYES: Eyes grossly normal to inspection.  No discharge or erythema, or obvious scleral/conjunctival abnormalities.  RESP: No audible wheeze, cough, or visible cyanosis.  No visible retractions or increased work of breathing.    SKIN: Visible skin clear. No significant rash, abnormal pigmentation or lesions.  NEURO: Cranial nerves grossly intact.  Mentation and speech appropriate for age.  PSYCH: Mentation appears normal, affect normal/bright, judgement and insight intact, normal speech and appearance well-groomed.          Video-Visit Details    Joined the call at 12/15/2022, 7:24:52 am.  Left the call at 12/15/2022, 7:35:26 am.  You were on the call for 10 minutes 33 seconds .    Originating Location (pt. Location): Home        Distant Location (provider location):  Off-site    Platform used for Video Visit: Cervalis

## 2022-12-15 NOTE — PATIENT INSTRUCTIONS
Hydroxyzine:  For anxiety during the day: 12.5-50mg every 4-6 hours as needed  For sleep at night: 50-100mg 30-60 mins before bed as needed

## 2023-01-23 ENCOUNTER — OFFICE VISIT (OUTPATIENT)
Dept: OBGYN | Facility: CLINIC | Age: 28
End: 2023-01-23
Payer: COMMERCIAL

## 2023-01-23 VITALS
WEIGHT: 188.2 LBS | SYSTOLIC BLOOD PRESSURE: 136 MMHG | HEIGHT: 65 IN | BODY MASS INDEX: 31.36 KG/M2 | HEART RATE: 93 BPM | OXYGEN SATURATION: 95 % | DIASTOLIC BLOOD PRESSURE: 87 MMHG

## 2023-01-23 DIAGNOSIS — Z30.41 ENCOUNTER FOR SURVEILLANCE OF CONTRACEPTIVE PILLS: ICD-10-CM

## 2023-01-23 DIAGNOSIS — Z01.419 ENCOUNTER FOR GYNECOLOGICAL EXAMINATION WITHOUT ABNORMAL FINDING: Primary | ICD-10-CM

## 2023-01-23 DIAGNOSIS — R87.810 CERVICAL HIGH RISK HPV (HUMAN PAPILLOMAVIRUS) TEST POSITIVE: ICD-10-CM

## 2023-01-23 PROCEDURE — 99395 PREV VISIT EST AGE 18-39: CPT | Performed by: NURSE PRACTITIONER

## 2023-01-23 PROCEDURE — 88175 CYTOPATH C/V AUTO FLUID REDO: CPT | Performed by: NURSE PRACTITIONER

## 2023-01-23 PROCEDURE — 87624 HPV HI-RISK TYP POOLED RSLT: CPT | Performed by: NURSE PRACTITIONER

## 2023-01-23 RX ORDER — DROSPIRENONE AND ETHINYL ESTRADIOL 0.03MG-3MG
1 KIT ORAL DAILY
Qty: 84 TABLET | Refills: 3 | Status: SHIPPED | OUTPATIENT
Start: 2023-01-23

## 2023-01-23 ASSESSMENT — ENCOUNTER SYMPTOMS
MYALGIAS: 0
PARESTHESIAS: 0
FREQUENCY: 0
PALPITATIONS: 0
HEADACHES: 1
HEMATURIA: 0
SORE THROAT: 0
ARTHRALGIAS: 0
WEAKNESS: 0
HEMATOCHEZIA: 0
CONSTIPATION: 0
DIZZINESS: 0
FEVER: 0
JOINT SWELLING: 0
ABDOMINAL PAIN: 0
CHILLS: 0
SHORTNESS OF BREATH: 0
EYE PAIN: 0
BREAST MASS: 0
NERVOUS/ANXIOUS: 0
NAUSEA: 0
HEARTBURN: 0
DIARRHEA: 0
DYSURIA: 0
COUGH: 0

## 2023-01-23 NOTE — PROGRESS NOTES
SUBJECTIVE:   CC: Savana is an 27 year old who presents for preventive health visit.     Healthy Habits:     Getting at least 3 servings of Calcium per day:  Yes    Bi-annual eye exam:  Yes    Dental care twice a year:  Yes    Sleep apnea or symptoms of sleep apnea:  Daytime drowsiness    Diet:  Regular (no restrictions)    Frequency of exercise:  2-3 days/week    Duration of exercise:  15-30 minutes    Taking medications regularly:  Yes    Medication side effects:  None    PHQ-2 Total Score: 2    Additional concerns today:  No      Today's PHQ-2 Score:   PHQ-2 ( 1999 Pfizer) 1/23/2023   Q1: Little interest or pleasure in doing things 1   Q2: Feeling down, depressed or hopeless 1   PHQ-2 Score 2   PHQ-2 Total Score (12-17 Years)- Positive if 3 or more points; Administer PHQ-A if positive -   Q1: Little interest or pleasure in doing things Several days   Q2: Feeling down, depressed or hopeless Several days   PHQ-2 Score 2           Social History     Tobacco Use     Smoking status: Never     Smokeless tobacco: Never   Substance Use Topics     Alcohol use: No         Alcohol Use 1/23/2023   Prescreen: >3 drinks/day or >7 drinks/week? No   Prescreen: >3 drinks/day or >7 drinks/week? -   No flowsheet data found.    Reviewed orders with patient.  Reviewed health maintenance and updated orders accordingly - Yes  Patient Active Problem List   Diagnosis     Dysmenorrhea     Cervical high risk HPV (human papillomavirus) test positive     Adjustment disorder with mixed anxiety and depressed mood     Past Surgical History:   Procedure Laterality Date     HIP SURGERY Right 05/19/2021     wisdom teeth  2016       Social History     Tobacco Use     Smoking status: Never     Smokeless tobacco: Never   Substance Use Topics     Alcohol use: No     Family History   Problem Relation Age of Onset     Hypertension Father      Allergies Father      Allergies Mother      Hypertension Paternal Grandfather      Prostate Cancer Paternal  Grandfather      Cancer - colorectal Maternal Grandmother      Allergies Brother      Cancer Maternal Grandfather            Breast Cancer Screening:  Any new diagnosis of family breast, ovarian, or bowel cancer? No    Patient under 40 years of age: Routine Mammogram Screening not recommended.   Pertinent mammograms are reviewed under the imaging tab.    History of abnormal Pap smear: YES - updated in Problem List and Health Maintenance accordingly  PAP / HPV Latest Ref Rng & Units 12/16/2021 12/15/2020 12/3/2019   PAP   Negative for Intraepithelial Lesion or Malignancy (NILM) - -   PAP (Historical) - - NIL ASC-US(A)   HPV16 Negative Negative - -   HPV18 Negative Negative - -   HRHPV Negative Positive(A) - -     Reviewed and updated as needed this visit by clinical staff   Tobacco  Allergies  Meds   Med Hx  Surg Hx  Fam Hx  Soc Hx        Reviewed and updated as needed this visit by Provider                 Past Medical History:   Diagnosis Date     Abnormal Pap smear of cervix 2019    see problem list     Cervical high risk HPV (human papillomavirus) test positive 12/16/2021     Dysmenorrhea       Past Surgical History:   Procedure Laterality Date     HIP SURGERY Right 05/19/2021     wisdom teeth  2016       Review of Systems  CONSTITUTIONAL: NEGATIVE for fever, chills, change in weight  INTEGUMENTARU/SKIN: NEGATIVE for worrisome rashes, moles or lesions  EYES: NEGATIVE for vision changes or irritation  ENT: NEGATIVE for ear, mouth and throat problems  RESP: NEGATIVE for significant cough or SOB  BREAST: NEGATIVE for masses, tenderness or discharge  CV: NEGATIVE for chest pain, palpitations or peripheral edema  GI: NEGATIVE for nausea, abdominal pain, heartburn, or change in bowel habits  : NEGATIVE for unusual urinary or vaginal symptoms. Periods are regular.  MUSCULOSKELETAL: NEGATIVE for significant arthralgias or myalgia  NEURO: NEGATIVE for weakness, dizziness or paresthesias  PSYCHIATRIC: NEGATIVE for  "changes in mood or affect     OBJECTIVE:   /87 (BP Location: Right arm, Patient Position: Sitting, Cuff Size: Adult Regular)   Pulse 93   Ht 1.651 m (5' 5\")   Wt 85.4 kg (188 lb 3.2 oz)   LMP 01/20/2023 (Approximate)   SpO2 95%   BMI 31.32 kg/m    Physical Exam  GENERAL: healthy, alert and no distress  NECK: no adenopathy, no asymmetry, masses, or scars and thyroid normal to palpation  RESP: lungs clear to auscultation - no rales, rhonchi or wheezes  BREAST: normal without masses, tenderness or nipple discharge and no palpable axillary masses or adenopathy  CV: regular rate and rhythm, normal S1 S2, no S3 or S4, no murmur, click or rub, no peripheral edema and peripheral pulses strong  ABDOMEN: soft, nontender, no hepatosplenomegaly, no masses and bowel sounds normal   (female): normal female external genitalia, normal urethral meatus, vaginal mucosa pink, small amount menstrual blood noted and normal cervix/adnexa/uterus without masses or discharge  MS: no gross musculoskeletal defects noted, no edema  SKIN: no suspicious lesions or rashes  NEURO: Normal strength and tone, mentation intact and speech normal  PSYCH: mentation appears normal, affect normal/bright    ASSESSMENT/PLAN:   (Z01.419) Encounter for gynecological examination without abnormal finding  (primary encounter diagnosis)  Comment: Health maintenance reviewed    (Z30.41) Encounter for surveillance of contraceptive pills  Comment: Desires to continue same pills. Refill x 1 year  Plan: drospirenone-ethinyl estradiol (AUSTEN) 3-0.03         MG tablet          (R87.810) Cervical high risk HPV (human papillomavirus) test positive  Comment: Follow up based on result  Plan: Pap imaged thin layer diagnostic with HPV        COUNSELING:  Reviewed preventive health counseling, as reflected in patient instructions  Special attention given to:        Regular exercise       Healthy diet/nutrition       Contraception        She reports that she has " never smoked. She has never used smokeless tobacco.      BANDAR Bennett CNP [facility]

## 2023-01-23 NOTE — PATIENT INSTRUCTIONS
If you have any questions regarding your visit, Please contact your care team.     [facility]: [phone]  To Schedule an Appointment 24/7  Call: [phone]LE[facility]ONE [facility]     Essence Ruvalcaba- APRN CNP      Dontrell Watson-Surgery Scheduler  Becky-Surgery Scheduler         Monday 7:30 am-5:00 pm    Tuesday 8:00 am-4:00 pm    Wednesday 7:30 am-4:00 pm  [place]    Thursday 8:00 am-11:00 am    Friday 7:30 am-4:00 pm [address] [place] [address] [phone] ask for [facility]  [phone] Fax    Imaging Scheduling all locations  [phone]    [facility]  [address] [place] [address]   [place], [place] [id number] [phone]         Urgent Care locations:  [facility]   Monday-Friday  10 am - 8 pm  Saturday and Sunday   9 am - 5 pm     [phone]   If you need a medication refill, please contact your pharmacy. Please allow 3 business days for your refill to be completed.  As always, Thank you for trusting us with your healthcare needs!      see additional instructions from your care team below

## 2023-01-30 ENCOUNTER — PATIENT OUTREACH (OUTPATIENT)
Dept: OBGYN | Facility: CLINIC | Age: 28
End: 2023-01-30
Payer: COMMERCIAL

## 2023-01-30 DIAGNOSIS — R87.810 CERVICAL HIGH RISK HPV (HUMAN PAPILLOMAVIRUS) TEST POSITIVE: ICD-10-CM

## 2023-02-06 ENCOUNTER — VIRTUAL VISIT (OUTPATIENT)
Dept: PSYCHOLOGY | Facility: CLINIC | Age: 28
End: 2023-02-06
Attending: PHYSICIAN ASSISTANT
Payer: COMMERCIAL

## 2023-02-06 DIAGNOSIS — F33.0 MAJOR DEPRESSIVE DISORDER, RECURRENT EPISODE, MILD (H): Primary | ICD-10-CM

## 2023-02-06 PROCEDURE — 90834 PSYTX W PT 45 MINUTES: CPT | Mod: 95 | Performed by: SOCIAL WORKER

## 2023-02-06 ASSESSMENT — COLUMBIA-SUICIDE SEVERITY RATING SCALE - C-SSRS
REASONS FOR IDEATION LIFETIME: MOSTLY TO END OR STOP THE PAIN (YOU COULDN'T GO ON LIVING WITH THE PAIN OR HOW YOU WERE FEELING)
1. HAVE YOU WISHED YOU WERE DEAD OR WISHED YOU COULD GO TO SLEEP AND NOT WAKE UP?: YES
TOTAL  NUMBER OF INTERRUPTED ATTEMPTS LIFETIME: NO
2. HAVE YOU ACTUALLY HAD ANY THOUGHTS OF KILLING YOURSELF?: YES
6. HAVE YOU EVER DONE ANYTHING, STARTED TO DO ANYTHING, OR PREPARED TO DO ANYTHING TO END YOUR LIFE?: NO
1. IN THE PAST MONTH, HAVE YOU WISHED YOU WERE DEAD OR WISHED YOU COULD GO TO SLEEP AND NOT WAKE UP?: NO
5. HAVE YOU STARTED TO WORK OUT OR WORKED OUT THE DETAILS OF HOW TO KILL YOURSELF? DO YOU INTEND TO CARRY OUT THIS PLAN?: NO
REASONS FOR IDEATION PAST MONTH: DOES NOT APPLY
4. HAVE YOU HAD THESE THOUGHTS AND HAD SOME INTENTION OF ACTING ON THEM?: NO
2. HAVE YOU ACTUALLY HAD ANY THOUGHTS OF KILLING YOURSELF?: NO
ATTEMPT LIFETIME: NO
TOTAL  NUMBER OF ABORTED OR SELF INTERRUPTED ATTEMPTS LIFETIME: NO
3. HAVE YOU BEEN THINKING ABOUT HOW YOU MIGHT KILL YOURSELF?: NO

## 2023-02-06 NOTE — PROGRESS NOTES
[facility]   Mental Health & Addiction Services     Progress Note - Initial Visit    Patient  Name:  Nina Washington  Date: 2023         Service Type: Individual     Visit Start Time: 10:36 am  Visit End Time: 11:28 am     Visit #: 1    Attendees: Client attended alone      Service Modality:  Video Visit:      Provider verified identity through the following two step process.  Patient provided:  Patient photo, Patient  and Patient address    Telemedicine Visit: The patient's condition can be safely assessed and treated via synchronous audio and visual telemedicine encounter.      Reason for Telemedicine Visit: Patient has requested telehealth visit    Originating Site (Patient Location): Patient's home    Distant Site (Provider Location): [facility]    Consent:  The patient/guardian has verbally consented to: the potential risks and benefits of telemedicine (video visit) versus in person care; bill my insurance or make self-payment for services provided; and responsibility for payment of non-covered services.     Patient would like the video invitation sent by:  My Chart    Mode of Communication:  Video Conference via [facility]    Distant Location (Provider):  On-site    As the provider I attest to compliance with applicable laws and regulations related to telemedicine.       DATA:   Interactive Complexity: No   Crisis: No     Presenting Concerns/  Current Stressors:   Depression, some problems with needing to evict a roommate, break up with a boyfriend.       ASSESSMENT:  Mental Status Assessment:  Appearance:   Appropriate   Eye Contact:   Good   Psychomotor Behavior: Normal   Attitude:   Cooperative  Friendly Pleasant  Orientation:   All  Speech   Rate / Production: Normal/ Responsive   Volume:  Normal   Mood:    Sad , Tearful  Affect:    Appropriate   Thought Content:  Clear   Thought Form:  Coherent   Insight:    Good  "      Safety Issues and Plan for Safety and Risk Management:     [place] Suicide Severity Rating Scale (Lifetime/Recent)  [place] Suicide Severity Rating (Lifetime/Recent) 2/6/2023   1. Wish to be Dead (Lifetime) 1   Wish to be Dead Description (Lifetime) 14-18 years old \"was rough\", but as an adult it is not very often. She reports in adulthood she has had 4 thoughts of not wanting to be alive total.   1. Wish to be Dead (Past 1 Month) 0   2. Non-Specific Active Suicidal Thoughts (Lifetime) 1   Non-Specific Active Suicidal Thought Description (Lifetime) November she had these thoughts. She reports, \"It seems like it would be easier sometimes.\"   2. Non-Specific Active Suicidal Thoughts (Past 1 Month) 0   3. Active Suicidal Ideation with any Methods (Not Plan) Without Intent to Act (Lifetime) 0   4. Active Suicidal Ideation with Some Intent to Act, Without Specific Plan (Lifetime) 0   5. Active Suicidal Ideation with Specific Plan and Intent (Lifetime) 0   Most Severe Ideation Rating (Lifetime) 3   Description of Most Severe Ideation (Lifetime) \"Younger closer to a 3\"   Most Severe Ideation Rating (Past 1 Month) 1   Frequency (Lifetime) 4   Duration (Lifetime) 1   Controllability (Lifetime) 2   Deterrents (Lifetime) 2   Deterrents (Past 1 Month) 0   Reasons for Ideation (Lifetime) 4   Reasons for Ideation (Past 1 Month) 0   Actual Attempt (Lifetime) 0   Has subject engaged in non-suicidal self-injurious behavior? (Lifetime) 0   Interrupted Attempts (Lifetime) 0   Aborted or Self-Interrupted Attempt (Lifetime) 0   Preparatory Acts or Behavior (Lifetime) 0   Calculated C-SSRS Risk Score (Lifetime/Recent) No Risk Indicated     Patient denies current fears or concerns for personal safety.  Patient denies current or recent suicidal ideation or behaviors.  Patient denies current or recent homicidal ideation or behaviors.  Patient denies current or recent self injurious behavior or ideation.  Patient denies other safety " concerns.  Recommended that patient call 911 or go to the local ED should there be a change in any of these risk factors.  Patient reports there are no firearms in the house.     Diagnostic Criteria:  Major Depressive Disorder  CRITERIA (A-C) REPRESENT A MAJOR DEPRESSIVE EPISODE - SELECT THESE CRITERIA  A) Recurrent episode(s) - symptoms have been present during the same 2-week period and represent a change from previous functioning 5 or more symptoms (required for diagnosis)   - Depressed mood. Note: In children and adolescents, can be irritable mood.     - Diminished interest or pleasure in all, or almost all, activities.    - Significant weight gainincrease in appetite.    - Increased sleep.    - Psychomotor activity retardation.    - Fatigue or loss of energy.   B) The symptoms cause clinically significant distress or impairment in social, occupational, or other important areas of functioning  C) The episode is not attributable to the physiological effects of a substance or to another medical condition  D) The occurence of major depressive episode is not better explained by other thought / psychotic disorders  E) There has never been a manic episode or hypomanic episode      DSM5 Diagnoses: (Sustained by DSM5 Criteria Listed Above)  Diagnoses: 296.31 (F33.0) Major Depressive Disorder, Recurrent Episode, Mild _  Psychosocial & Contextual Factors: Break-up, problems with a previous roommate, some relational lack of understanding about mental health.   WHODAS 2.0 (12 item):   WHODAS 2.0 Total Score 2/5/2023   Total Score 25   Total Score MyChart 25     Intervention:   MI-  Patient was educated on following motivational interviewing skill :co-developing goals, supporting client's own self-efficacy, develop discrepancy, etc  Collateral Reports Completed:  Routed note to PCP      PLAN: (Homework, other):  1. Provider will continue Diagnostic Assessment.  Patient was given the following to do until next session:   Consider goals to work on.     2. Provider recommended the following referrals: None    3.  Suicide Risk and Safety Concerns were assessed for Nina Washington.    Patient meets the following risk assessment and triage: Patient denied any current/recent/lifetime history of suicidal ideation and/or behaviors.  No safety plan indicated at this time. Client reports Suicidal ideation in the past with no plans to harm herself. We could develop a safety plan if she feels this would be beneficial, however there is no concern for risk from what is being reported at this time.       Thi Rivera, [facility], [facility] [place]  February 6, 2023

## 2023-02-09 ASSESSMENT — PATIENT HEALTH QUESTIONNAIRE - PHQ9
10. IF YOU CHECKED OFF ANY PROBLEMS, HOW DIFFICULT HAVE THESE PROBLEMS MADE IT FOR YOU TO DO YOUR WORK, TAKE CARE OF THINGS AT HOME, OR GET ALONG WITH OTHER PEOPLE: SOMEWHAT DIFFICULT
SUM OF ALL RESPONSES TO PHQ QUESTIONS 1-9: 6
SUM OF ALL RESPONSES TO PHQ QUESTIONS 1-9: 6

## 2023-02-10 ENCOUNTER — VIRTUAL VISIT (OUTPATIENT)
Dept: FAMILY MEDICINE | Facility: CLINIC | Age: 28
End: 2023-02-10
Payer: COMMERCIAL

## 2023-02-10 DIAGNOSIS — F43.23 ADJUSTMENT DISORDER WITH MIXED ANXIETY AND DEPRESSED MOOD: ICD-10-CM

## 2023-02-10 PROCEDURE — 99213 OFFICE O/P EST LOW 20 MIN: CPT | Mod: VID | Performed by: PHYSICIAN ASSISTANT

## 2023-02-10 ASSESSMENT — ANXIETY QUESTIONNAIRES
GAD7 TOTAL SCORE: 2
2. NOT BEING ABLE TO STOP OR CONTROL WORRYING: NOT AT ALL
1. FEELING NERVOUS, ANXIOUS, OR ON EDGE: NOT AT ALL
7. FEELING AFRAID AS IF SOMETHING AWFUL MIGHT HAPPEN: NOT AT ALL
6. BECOMING EASILY ANNOYED OR IRRITABLE: SEVERAL DAYS
GAD7 TOTAL SCORE: 2
5. BEING SO RESTLESS THAT IT IS HARD TO SIT STILL: NOT AT ALL
3. WORRYING TOO MUCH ABOUT DIFFERENT THINGS: SEVERAL DAYS
IF YOU CHECKED OFF ANY PROBLEMS ON THIS QUESTIONNAIRE, HOW DIFFICULT HAVE THESE PROBLEMS MADE IT FOR YOU TO DO YOUR WORK, TAKE CARE OF THINGS AT HOME, OR GET ALONG WITH OTHER PEOPLE: NOT DIFFICULT AT ALL

## 2023-02-10 ASSESSMENT — PATIENT HEALTH QUESTIONNAIRE - PHQ9
5. POOR APPETITE OR OVEREATING: NOT AT ALL
SUM OF ALL RESPONSES TO PHQ QUESTIONS 1-9: 6
10. IF YOU CHECKED OFF ANY PROBLEMS, HOW DIFFICULT HAVE THESE PROBLEMS MADE IT FOR YOU TO DO YOUR WORK, TAKE CARE OF THINGS AT HOME, OR GET ALONG WITH OTHER PEOPLE: SOMEWHAT DIFFICULT

## 2023-02-10 NOTE — PROGRESS NOTES
"Savana is a 27 year old who is being evaluated via a billable video visit.      How would you like to obtain your AVS? MyChart  If the video visit is dropped, the invitation should be resent by: Text to cell phone: [phone]  Will anyone else be joining your video visit? No          Assessment & Plan       ICD-10-CM    1. Adjustment disorder with mixed anxiety and depressed mood  F43.23 FLUoxetine (PROZAC) 20 MG capsule          YADIEL score at goal, PHQ score improved and near goal. Continue Prozac, no changes today. She feels as though things are going pretty well. Follow up annually, sooner if needed.      Prescription drug management         BMI:   Estimated body mass index is 31.32 kg/m  as calculated from the following:    Height as of 1/23/23: 1.651 m (5' 5\").    Weight as of 1/23/23: 85.4 kg (188 lb 3.2 oz).       No follow-ups on file.    Amanda Rivas PA-C  [facility] HAZEL Sawant is a 27 year old, presenting for the following health issues:  Recheck Medication      History of Present Illness       Mental Health Follow-up:  Patient presents to follow-up on Depression.Patient's depression since last visit has been:  Medium  The patient is not having other symptoms associated with depression.      Any significant life events: No  Patient is not feeling anxious or having panic attacks.  Patient has no concerns about alcohol or drug use.    She eats 2-3 servings of fruits and vegetables daily.She consumes 0 sweetened beverage(s) daily.She exercises with enough effort to increase her heart rate 10 to 19 minutes per day.  She exercises with enough effort to increase her heart rate 3 or less days per week.   She is taking medications regularly.    Today's PHQ-9         PHQ-9 Total Score: 6    PHQ-9 Q9 Thoughts of better off dead/self-harm past 2 weeks :   Not at all    How difficult have these problems made it for you to do your work, take care of things at home, or get along " with other people: Somewhat difficult       Situational stressors are improved  Has only used hydroxyine a couple times for sleep      Review of Systems   Constitutional, and psych systems are negative, except as otherwise noted.      Objective           Vitals:  No vitals were obtained today due to virtual visit.    Physical Exam   GENERAL: Healthy, alert and no distress  EYES: Eyes grossly normal to inspection.  No discharge or erythema, or obvious scleral/conjunctival abnormalities.  RESP: No audible wheeze, cough, or visible cyanosis.  No visible retractions or increased work of breathing.    SKIN: Visible skin clear. No significant rash, abnormal pigmentation or lesions.  NEURO: Cranial nerves grossly intact.  Mentation and speech appropriate for age.  PSYCH: Mentation appears normal, affect normal/bright, judgement and insight intact, normal speech and appearance well-groomed.          Video-Visit Details    Type of service:  Video Visit     Originating Location (pt. Location): Home    Distant Location (provider location):  On-site  Platform used for Video Visit: Deepthi

## 2023-03-05 NOTE — PROGRESS NOTES
SUBJECTIVE:                                                    Nina Washington is a 21 year old female who presents to clinic today for the following health issues:      RESPIRATORY SYMPTOMS      Duration: 3-4 weeks    Description  Coughing up phlegm and congested in the am     Severity: mild    Accompanying signs and symptoms: None    History (predisposing factors):  none    Precipitating or alleviating factors: worse in the am     Therapies tried and outcome:  rest and fluids     3-4 weeks had flu like symptoms fever body aches vomiting then went away  But since then had a constant cough  This morning was really worsening  Cough chest congestion  Fever No  Sinus congestion/sinus pain No  Wheezing: No  Chest pain or exertional shortness of breath: NO   Exposure to pertussis or pertussis like symptoms: No  Orthopnea, worsening edema, pnd: NO  Rash: NO  Tried OTC medications without relief  No hemoptysis.  Worsening symptoms hence patient came in to be seen     Problem list and histories reviewed & adjusted, as indicated.  Additional history: as documented    Problem list, Medication list, Allergies, and Medical/Social/Surgical histories reviewed in EPIC and updated as appropriate.    ROS:  Constitutional, HEENT, cardiovascular, pulmonary, gi and gu systems are negative, except as otherwise noted.    OBJECTIVE:                                                    /79  Pulse 80  Temp 98.1  F (36.7  C) (Oral)  Wt 161 lb (73 kg)  SpO2 96%  BMI 26.79 kg/m2  Body mass index is 26.79 kg/(m^2).  GENERAL: healthy, alert and no distress  EYES: pink palpebral conjunctiva, anicteric sclera  ENT: midline nasal septum normal ear exam. congested sinuses.   Mouth: moist buccal mucosa nonhyperemic posterior pharyngeal wall. No tonsillar enlargement or cellulitis  NECK: no adenopathy, no asymmetry, masses, or scars and thyroid normal to palpation  RESP: lungs clear to auscultation - No  rales, rhonchi or wheezes    CV:  "Kristine"Patricia Ortiz was seen and treated in our emergency department on 3/5/2023.     COVID-19 is present in our communities across the state. There is limited testing for COVID at this time, so not all patients can be tested. In this situation, your employee meets the following criteria:    Kristine Ortiz has met the criteria for COVID-19 testing and has a POSITIVE result. She can return to work once they are asymptomatic for 24 hours without the use of fever reducing medications AND at least five days from the first positive result. A mask is recommended for 5 days post quarantine.     If you have any questions or concerns, or if I can be of further assistance, please do not hesitate to contact me.    Sincerely,              MD" regular rate and rhythm, normal S1 S2, no S3 or S4,  No murmurs, click or rub  SKIN: no visible rashes noted  Pscyh: Appropriate mood and affect  MS: no gross musculoskeletal defects noted    Diagnostic Test Results:  none      ASSESSMENT/PLAN:                                                      No diagnosis found.      ICD-10-CM    1. Acute bronchitis with symptoms > 10 days J20.9 azithromycin (ZITHROMAX) 250 MG tablet     Patient given a prescription for zithromax. Side effects of antibiotic discussed. Aware of small but statistically significant increase cardiovascular risk with antibiotic.    Advised that prolonged cough > 3 weeks recommend chest xray, offered today, declined.   Adverse reactions of medications discussed.  Over the counter medications discussed.   Aware to come back in if with worsening symptoms or if no relief despite treatment plan  Patient voiced understanding and had no further questions.     MD Jennifer Bruce MD  [facility]

## 2023-03-14 ENCOUNTER — TELEPHONE (OUTPATIENT)
Dept: OBGYN | Facility: CLINIC | Age: 28
End: 2023-03-14

## 2023-03-14 NOTE — TELEPHONE ENCOUNTER
[facility] Call Center    Phone Message    May a detailed message be left on voicemail: yes     Reason for Call: The patient called to reschedule her colposcopy. Please contact patient. Thank you.    Action Taken: Message routed to:  [facility] p [phone]    Travel Screening: Not Applicable

## 2023-04-03 ENCOUNTER — OFFICE VISIT (OUTPATIENT)
Dept: OBGYN | Facility: CLINIC | Age: 28
End: 2023-04-03
Payer: COMMERCIAL

## 2023-04-03 VITALS
HEART RATE: 100 BPM | DIASTOLIC BLOOD PRESSURE: 90 MMHG | SYSTOLIC BLOOD PRESSURE: 130 MMHG | WEIGHT: 189 LBS | BODY MASS INDEX: 31.45 KG/M2

## 2023-04-03 DIAGNOSIS — Z32.00 PREGNANCY EXAMINATION OR TEST, PREGNANCY UNCONFIRMED: Primary | ICD-10-CM

## 2023-04-03 DIAGNOSIS — R87.810 CERVICAL HIGH RISK HPV (HUMAN PAPILLOMAVIRUS) TEST POSITIVE: ICD-10-CM

## 2023-04-03 LAB — HCG UR QL: NEGATIVE

## 2023-04-03 PROCEDURE — 57454 BX/CURETT OF CERVIX W/SCOPE: CPT | Performed by: OBSTETRICS & GYNECOLOGY

## 2023-04-03 PROCEDURE — 88305 TISSUE EXAM BY PATHOLOGIST: CPT | Performed by: STUDENT IN AN ORGANIZED HEALTH CARE EDUCATION/TRAINING PROGRAM

## 2023-04-03 PROCEDURE — 99212 OFFICE O/P EST SF 10 MIN: CPT | Mod: 25 | Performed by: OBSTETRICS & GYNECOLOGY

## 2023-04-03 PROCEDURE — 81025 URINE PREGNANCY TEST: CPT | Performed by: OBSTETRICS & GYNECOLOGY

## 2023-04-03 NOTE — PROGRESS NOTES
Patient Name: Nina Washington              Date: 4/3/2023   YOB: 1995                         Age: 28 year old   Phone: [phone] (home)   ________________________________________________________________________  I have been asked to see Nina in consultation by CALOS TAYLOR  to discuss the pap smear, findings and possible further evaluation.  The patient's pap smear history is as noted:  Cervical high risk HPV (human papillomavirus) test positive  12/3/2019   Overview    2016 NIL Pap  12/3/19 ASCUS Pap (23 yo). Plan pap only in 1 year.   12/15/20 NIL Pap at 26 yo. Plan pap only in 1 year.   12/16/21 NIL Pap, +HR HPV (neg 16/18). Plan cotest in 1 year.   1/23/23 NIL pap, +HR HPV (not 16/18). Plan: colposcopy due before 4/23/23 2/1/23 Pt notified  4/3/23 Flasher appt          I attempted to ensure that the patient was educated regarding the nature of her findings and implications to date.  We reviewed the role of HPV, incidence in the population and the natural history of the infection, and its transmission.  We also reviewed ways to minimize her future risk, the effect of HPV on the cervix and treatment options available, should they be indicated.    The pathophysiology of the cervix, including a discussion of the squamous and columnar cells, metaplasia and dysplasia have been reviewed, drawings, sketches and the pamphlets were reviewed with her.      No LMP recorded. (Menstrual status: Birth Control).  Current Birth Control Method: oral contraceptive      Past Medical History:   Diagnosis Date     Abnormal Pap smear of cervix 2019    see problem list     Cervical high risk HPV (human papillomavirus) test positive 12/16/2021     Dysmenorrhea        Past Surgical History:   Procedure Laterality Date     HIP SURGERY Right 05/19/2021     wisdom teeth  2016        Outpatient Encounter Medications as of 4/3/2023   Medication Sig Dispense Refill     drospirenone-ethinyl estradiol (AUSTEN) 3-0.03 MG tablet  Take 1 tablet by mouth daily 84 tablet 3     FLUoxetine (PROZAC) 20 MG capsule Take 1 capsule (20 mg) by mouth daily 90 capsule 3     hydrOXYzine (ATARAX) 25 MG tablet Take 0.5-2 tablets (12.5-50 mg) by mouth every 4 hours as needed for anxiety - May use 50-100mg at bedtime for sleep. Max dose 400mg/day 40 tablet 1     No facility-administered encounter medications on file as of 4/3/2023.        Allergies as of 04/03/2023 - Reviewed 02/10/2023   Allergen Reaction Noted     Nkda [no known drug allergies]  08/04/2009       Social History     Socioeconomic History     Marital status: Single     Spouse name: partner- Levon     Number of children: 0   Occupational History     Occupation: Xray tech     Comment: [employer]     Occupation: student     Comment: [employer]   Tobacco Use     Smoking status: Never     Smokeless tobacco: Never   Vaping Use     Vaping status: Never Used   Substance and Sexual Activity     Alcohol use: No     Drug use: No     Sexual activity: Yes     Partners: Male     Birth control/protection: Pill   Other Topics Concern      Service No     Blood Transfusions No     Caffeine Concern No     Occupational Exposure No     Hobby Hazards No     Sleep Concern No     Stress Concern No     Weight Concern No     Special Diet No     Back Care No     Exercise Yes     Bike Helmet No     Seat Belt Yes     Self-Exams No        Family History   Problem Relation Age of Onset     Hypertension Father      Allergies Father      Allergies Mother      Hypertension Paternal Grandfather      Prostate Cancer Paternal Grandfather      Cancer - colorectal Maternal Grandmother      Allergies Brother      Cancer Maternal Grandfather          Review Of Systems  10 point ROS of systems including Constitutional, Eyes, Respiratory, Cardiovascular, Gastroenterology, Genitourinary, Integumentary, Muscularskeletal, Psychiatric were all negative except for pertinent positives noted in my HPI and in the  PMH.      Exam:   There were no vitals taken for this visit.  GENERAL:  WNWD female NAD  HEENT: NC/AT, EOMI  Lungs:  Good respiratory effort   SKIN: normal skin turgor  GAIT: Normal  NECK: Symmetrical, no masses noted   VULVA: Normal Genitalia  BUS: Normal  URETHRA:  No hypermobility noted  URETHRAL MEATUS:  No masses noted  VAGINA: Normal mucosa, no discharge  CERVIX: Closed, mobile, no discharge  PERIANAL:  No masses or lesions seen  EXTREMITIES: no clubbing, cyanosis, or edema      Plan:  Recommend to Proceed with Colpo  The details of the colposcopic procedure were reviewed, the risks of missed diagnoses, pain, infection, and bleeding.    Total time preparing to see patient with reviewing prior encounter and labs, face to face time, and coordinating care on the same calendar date:           Procedure:  Procedure for colposcopy and biopsy has been explained to the patient and consent obtained.    Before the procedure, it was ensured that the patient was educated regarding the nature of her findings and implications to date.  We reviewed the role of HPV and the natural history of the infection.  We also reviewed ways to minimize her future risk, the effect of HPV on the cervix and treatment options available, should they be indicated.    The pathophysiology of the cervix, including a discussion of the squamous and columnar cells, metaplasia and dysplasia have been reviewed, drawings, sketches and the pamphlets were reviewed with her.  The details of the colposcopic procedure were reviewed, the risks of missed diagnoses, pain, infection, and bleeding.  Questions seemed to be answered before proceeding and the patient then consented to the procedure.         biopsies taken: ECC and cevcix at 1 oclock.  Hemostasis effected with Silver Nitrate.     Findings:    Cervix: acetowhitening noted 1 oclock.  Vaginal inspection: no visible lesions.  Procedure Summary: Patient tolerated procedure well.      Assessment/Plan:     ICD-10-CM    1. Pregnancy examination or test, pregnancy unconfirmed  Z32.00 HCG Qual, Urine ([id number])     COLP CERVIX/UPPER VAGINA W BX CERVIX     Surgical Pathology Exam      2. Cervical high risk HPV (human papillomavirus) test positive  R87.810 COLP CERVIX/UPPER VAGINA W BX CERVIX     Surgical Pathology Exam          Specimens labelled and sent to pathology.  Will base further treatment on pathology findings.  Post biopsy instructions given to patient and call to discuss Pathology results.    Total time preparing to see patient with reviewing prior encounter and labs, face to face time, and coordinating care on the same calendar date: 30 mins.    Nurse present for exam.  \CEPHAS AGBEH, MD.

## 2023-04-05 ENCOUNTER — PATIENT OUTREACH (OUTPATIENT)
Dept: OBGYN | Facility: CLINIC | Age: 28
End: 2023-04-05
Payer: COMMERCIAL

## 2023-04-05 LAB
PATH REPORT.COMMENTS IMP SPEC: NORMAL
PATH REPORT.COMMENTS IMP SPEC: NORMAL
PATH REPORT.FINAL DX SPEC: NORMAL
PATH REPORT.GROSS SPEC: NORMAL
PATH REPORT.MICROSCOPIC SPEC OTHER STN: NORMAL
PATH REPORT.RELEVANT HX SPEC: NORMAL
PHOTO IMAGE: NORMAL

## 2023-06-06 ENCOUNTER — MYC MEDICAL ADVICE (OUTPATIENT)
Dept: FAMILY MEDICINE | Facility: CLINIC | Age: 28
End: 2023-06-06
Payer: COMMERCIAL

## 2023-06-06 NOTE — TELEPHONE ENCOUNTER
MyChart sent regarding ear congestion/fullness.    King Cook, RN  Triage Nurse  [facility], [facility]

## 2023-08-22 ENCOUNTER — MYC MEDICAL ADVICE (OUTPATIENT)
Dept: FAMILY MEDICINE | Facility: CLINIC | Age: 28
End: 2023-08-22
Payer: COMMERCIAL

## 2024-03-19 ENCOUNTER — PATIENT OUTREACH (OUTPATIENT)
Dept: OBGYN | Facility: CLINIC | Age: 29
End: 2024-03-19
Payer: COMMERCIAL

## 2024-03-19 DIAGNOSIS — R87.810 CERVICAL HIGH RISK HPV (HUMAN PAPILLOMAVIRUS) TEST POSITIVE: Primary | ICD-10-CM

## 2024-04-20 ENCOUNTER — HEALTH MAINTENANCE LETTER (OUTPATIENT)
Age: 29
End: 2024-04-20

## 2025-05-08 ENCOUNTER — PATIENT OUTREACH (OUTPATIENT)
Dept: CARE COORDINATION | Facility: CLINIC | Age: 30
End: 2025-05-08
Payer: COMMERCIAL